# Patient Record
Sex: MALE | Race: WHITE | Employment: OTHER | URBAN - METROPOLITAN AREA
[De-identification: names, ages, dates, MRNs, and addresses within clinical notes are randomized per-mention and may not be internally consistent; named-entity substitution may affect disease eponyms.]

---

## 2019-01-01 ENCOUNTER — HOSPITAL ENCOUNTER (EMERGENCY)
Age: 74
Discharge: HOME | End: 2019-01-02
Attending: EMERGENCY MEDICINE
Payer: MEDICARE

## 2019-01-01 ENCOUNTER — APPOINTMENT (OUTPATIENT)
Dept: GENERAL RADIOLOGY | Age: 74
End: 2019-01-01
Attending: EMERGENCY MEDICINE
Payer: MEDICARE

## 2019-01-01 DIAGNOSIS — R04.2 COUGH WITH HEMOPTYSIS: Primary | ICD-10-CM

## 2019-01-01 DIAGNOSIS — J61 PULMONARY ASBESTOSIS (HCC): ICD-10-CM

## 2019-01-01 DIAGNOSIS — I48.20 ATRIAL FIBRILLATION, CHRONIC (HCC): ICD-10-CM

## 2019-01-01 DIAGNOSIS — Z79.01 ENCOUNTER FOR CURRENT LONG-TERM USE OF ANTICOAGULANTS: ICD-10-CM

## 2019-01-01 LAB
ALBUMIN SERPL-MCNC: 3.7 G/DL (ref 3.4–5)
ALBUMIN/GLOB SERPL: 1 {RATIO} (ref 0.8–1.7)
ALP SERPL-CCNC: 144 U/L (ref 45–117)
ALT SERPL-CCNC: 28 U/L (ref 16–61)
ANION GAP SERPL CALC-SCNC: 8 MMOL/L (ref 3–18)
AST SERPL-CCNC: 17 U/L (ref 15–37)
BASOPHILS # BLD: 0 K/UL (ref 0–0.1)
BASOPHILS NFR BLD: 0 % (ref 0–2)
BILIRUB SERPL-MCNC: 0.9 MG/DL (ref 0.2–1)
BUN SERPL-MCNC: 24 MG/DL (ref 7–18)
BUN/CREAT SERPL: 25
CALCIUM SERPL-MCNC: 8.9 MG/DL (ref 8.5–10.1)
CHLORIDE SERPL-SCNC: 104 MMOL/L (ref 100–108)
CK MB CFR SERPL CALC: 4.6 % (ref 0–4)
CK MB SERPL-MCNC: 1.3 NG/ML (ref 5–25)
CK SERPL-CCNC: 28 U/L (ref 39–308)
CO2 SERPL-SCNC: 27 MMOL/L (ref 21–32)
CREAT SERPL-MCNC: 0.96 MG/DL (ref 0.6–1.3)
DIFFERENTIAL METHOD BLD: ABNORMAL
EOSINOPHIL # BLD: 0.2 K/UL (ref 0–0.4)
EOSINOPHIL NFR BLD: 2 % (ref 0–5)
ERYTHROCYTE [DISTWIDTH] IN BLOOD BY AUTOMATED COUNT: 18.3 % (ref 11.6–14.5)
GLOBULIN SER CALC-MCNC: 3.7 G/DL (ref 2–4)
GLUCOSE BLD STRIP.AUTO-MCNC: 150 MG/DL (ref 70–110)
GLUCOSE SERPL-MCNC: 145 MG/DL (ref 74–99)
HCT VFR BLD AUTO: 38.7 % (ref 36–48)
HGB BLD-MCNC: 12.3 G/DL (ref 13–16)
INR PPP: 2.3 (ref 0.8–1.2)
LYMPHOCYTES # BLD: 1.2 K/UL (ref 0.9–3.6)
LYMPHOCYTES NFR BLD: 13 % (ref 21–52)
MCH RBC QN AUTO: 32.5 PG (ref 24–34)
MCHC RBC AUTO-ENTMCNC: 31.8 G/DL (ref 31–37)
MCV RBC AUTO: 102.4 FL (ref 74–97)
MONOCYTES # BLD: 1.3 K/UL (ref 0.05–1.2)
MONOCYTES NFR BLD: 13 % (ref 3–10)
NEUTS SEG # BLD: 6.8 K/UL (ref 1.8–8)
NEUTS SEG NFR BLD: 72 % (ref 40–73)
PLATELET # BLD AUTO: 268 K/UL (ref 135–420)
PMV BLD AUTO: 10.5 FL (ref 9.2–11.8)
POTASSIUM SERPL-SCNC: 4 MMOL/L (ref 3.5–5.5)
PROT SERPL-MCNC: 7.4 G/DL (ref 6.4–8.2)
PROTHROMBIN TIME: 25.2 SEC (ref 11.5–15.2)
RBC # BLD AUTO: 3.78 M/UL (ref 4.7–5.5)
SODIUM SERPL-SCNC: 139 MMOL/L (ref 136–145)
TROPONIN I SERPL-MCNC: <0.02 NG/ML (ref 0–0.04)
WBC # BLD AUTO: 9.4 K/UL (ref 4.6–13.2)

## 2019-01-01 PROCEDURE — 82962 GLUCOSE BLOOD TEST: CPT

## 2019-01-01 PROCEDURE — 99285 EMERGENCY DEPT VISIT HI MDM: CPT

## 2019-01-01 PROCEDURE — 80053 COMPREHEN METABOLIC PANEL: CPT

## 2019-01-01 PROCEDURE — 85025 COMPLETE CBC W/AUTO DIFF WBC: CPT

## 2019-01-01 PROCEDURE — 93005 ELECTROCARDIOGRAM TRACING: CPT

## 2019-01-01 PROCEDURE — 85610 PROTHROMBIN TIME: CPT

## 2019-01-01 PROCEDURE — 74011250636 HC RX REV CODE- 250/636: Performed by: EMERGENCY MEDICINE

## 2019-01-01 PROCEDURE — 71045 X-RAY EXAM CHEST 1 VIEW: CPT

## 2019-01-01 PROCEDURE — 82550 ASSAY OF CK (CPK): CPT

## 2019-01-01 PROCEDURE — 96375 TX/PRO/DX INJ NEW DRUG ADDON: CPT

## 2019-01-01 PROCEDURE — 96374 THER/PROPH/DIAG INJ IV PUSH: CPT

## 2019-01-01 RX ORDER — TAMSULOSIN HYDROCHLORIDE 0.4 MG/1
0.4 CAPSULE ORAL DAILY
COMMUNITY

## 2019-01-01 RX ORDER — ALBUTEROL SULFATE 90 UG/1
AEROSOL, METERED RESPIRATORY (INHALATION)
COMMUNITY

## 2019-01-01 RX ORDER — OMEPRAZOLE 20 MG/1
20 CAPSULE, DELAYED RELEASE ORAL DAILY
COMMUNITY

## 2019-01-01 RX ORDER — LIDOCAINE 50 MG/G
PATCH TOPICAL EVERY 24 HOURS
COMMUNITY
End: 2019-07-10

## 2019-01-01 RX ORDER — FLUTICASONE PROPIONATE AND SALMETEROL 500; 50 UG/1; UG/1
1 POWDER RESPIRATORY (INHALATION) EVERY 12 HOURS
COMMUNITY

## 2019-01-01 RX ORDER — LOSARTAN POTASSIUM 25 MG/1
25 TABLET ORAL DAILY
COMMUNITY

## 2019-01-01 RX ORDER — REPAGLINIDE 0.5 MG/1
0.5 TABLET ORAL
COMMUNITY

## 2019-01-01 RX ORDER — HYDROCODONE BITARTRATE AND ACETAMINOPHEN 5; 325 MG/1; MG/1
TABLET ORAL
COMMUNITY
End: 2019-07-10

## 2019-01-01 RX ORDER — GUAIFENESIN 100 MG/5ML
81 LIQUID (ML) ORAL DAILY
COMMUNITY

## 2019-01-01 RX ORDER — ATORVASTATIN CALCIUM 40 MG/1
40 TABLET, FILM COATED ORAL DAILY
COMMUNITY

## 2019-01-01 RX ORDER — WARFARIN 3 MG/1
3 TABLET ORAL 3 TIMES WEEKLY
COMMUNITY

## 2019-01-01 RX ORDER — WARFARIN 2 MG/1
1 TABLET ORAL
COMMUNITY

## 2019-01-01 RX ORDER — BUPROPION HYDROCHLORIDE 300 MG/1
300 TABLET ORAL
COMMUNITY

## 2019-01-01 RX ORDER — DOCUSATE SODIUM 100 MG/1
100 CAPSULE, LIQUID FILLED ORAL 2 TIMES DAILY
COMMUNITY

## 2019-01-01 RX ORDER — DONEPEZIL HYDROCHLORIDE 10 MG/1
10 TABLET, FILM COATED ORAL
COMMUNITY

## 2019-01-01 RX ORDER — POLYETHYLENE GLYCOL 3350 17 G/17G
17 POWDER, FOR SOLUTION ORAL DAILY
COMMUNITY

## 2019-01-01 RX ORDER — DULOXETIN HYDROCHLORIDE 60 MG/1
60 CAPSULE, DELAYED RELEASE ORAL DAILY
COMMUNITY

## 2019-01-01 RX ORDER — ONDANSETRON 2 MG/ML
4 INJECTION INTRAMUSCULAR; INTRAVENOUS
Status: COMPLETED | OUTPATIENT
Start: 2019-01-01 | End: 2019-01-01

## 2019-01-01 RX ORDER — BISMUTH SUBSALICYLATE 262 MG
1 TABLET,CHEWABLE ORAL DAILY
COMMUNITY

## 2019-01-01 RX ORDER — MORPHINE SULFATE 4 MG/ML
4 INJECTION INTRAVENOUS
Status: COMPLETED | OUTPATIENT
Start: 2019-01-01 | End: 2019-01-01

## 2019-01-01 RX ORDER — INSULIN LISPRO 100 [IU]/ML
INJECTION, SOLUTION INTRAVENOUS; SUBCUTANEOUS
COMMUNITY
End: 2019-07-10

## 2019-01-01 RX ADMIN — ONDANSETRON 4 MG: 2 INJECTION INTRAMUSCULAR; INTRAVENOUS at 23:00

## 2019-01-01 RX ADMIN — MORPHINE SULFATE 4 MG: 4 INJECTION INTRAVENOUS at 23:00

## 2019-01-02 VITALS
SYSTOLIC BLOOD PRESSURE: 115 MMHG | RESPIRATION RATE: 11 BRPM | BODY MASS INDEX: 22.99 KG/M2 | WEIGHT: 138 LBS | OXYGEN SATURATION: 97 % | TEMPERATURE: 97.9 F | HEART RATE: 58 BPM | HEIGHT: 65 IN | DIASTOLIC BLOOD PRESSURE: 71 MMHG

## 2019-01-02 RX ORDER — ONDANSETRON 4 MG/1
4 TABLET, FILM COATED ORAL
Qty: 12 TAB | Refills: 1 | Status: SHIPPED | OUTPATIENT
Start: 2019-01-02

## 2019-01-02 NOTE — ED NOTES
I have reviewed discharge instructions with the patient. The patient verbalized understanding. I have reviewed the provider's instructions with the patient, answering all questions to his satisfaction. Discharge medications reviewed with patient and appropriate educational materials and side effects teaching were provided. Patient armband removed and shredded. Pt signed paper discharge instructions pt escorted via wheelchair to ED exit.

## 2019-01-02 NOTE — ED TRIAGE NOTES
Pt C/O feeling weaker than normal. He has vomited x1 and has been coughing up blood for about a month but has been going to his doctor to rule out why. He has been ruled out for TB.

## 2019-01-02 NOTE — ED PROVIDER NOTES
EMERGENCY DEPARTMENT HISTORY AND PHYSICAL EXAM 
 
Date: 1/1/2019 Patient Name: Giovani Fregoso History of Presenting Illness Chief Complaint Patient presents with  Fatigue History Provided By: Patient Chief Complaint: Fatigue Duration: 3.5 hours Timing:  Worsening Location: [place] Associated Symptoms: nausea, vomiting, sharp abdominal pain, and decreased appetite Additional History (Context):  
9:53 PM 
Giovani Fregoso is a 68 y.o. male with PMHx of CAD, A-fib, and DM ans PSHx of right pneumonectomy who presents via EMS to the emergency department C/O worsening fatigue, onset 3.5 hours ago. Associated sxs include nausea, vomiting, sharp abdominal pain, and decreased appetite. Pt also C/O hemoptysis x 1 month (at least 5-6 episodes/day) and has been going to his PCP to rule out why he is coughing up blood. He has been ruled out for TB. Pt is on Coumadin. Pt reports that his weight has also been steadily dropping. Pt has follow up with pulmonologist tomorrow. FHx of HTN and DM. No H/O CHF. Pt is a former tobacco user (quit 20 years ago). Pt denies leg swelling, diarrhea, or any other sxs or complaints. PCP: Paul, MD Jr 
 
Current Outpatient Medications Medication Sig Dispense Refill  ondansetron hcl (ZOFRAN) 4 mg tablet Take 1 Tab by mouth every eight (8) hours as needed for Nausea. 12 Tab 1  
 warfarin (COUMADIN) 2 mg tablet Take 2 mg by mouth four (4) days a week.  warfarin (COUMADIN) 3 mg tablet Take 3 mg by mouth Three (3) times a week.  albuterol (PROVENTIL HFA, VENTOLIN HFA, PROAIR HFA) 90 mcg/actuation inhaler Take  by inhalation.  aspirin 81 mg chewable tablet Take 81 mg by mouth daily.  atorvastatin (LIPITOR) 40 mg tablet Take 40 mg by mouth daily.  buPROPion XL (WELLBUTRIN XL) 300 mg XL tablet Take 300 mg by mouth every morning.  docusate sodium (COLACE) 100 mg capsule Take 100 mg by mouth two (2) times a day.  donepezil (ARICEPT) 10 mg tablet Take 10 mg by mouth nightly.  DULoxetine (CYMBALTA) 60 mg capsule Take 60 mg by mouth daily.  ferrous sulfate 325 mg (65 mg iron) cpER Take  by mouth.  fluticasone-salmeterol (ADVAIR DISKUS) 500-50 mcg/dose diskus inhaler Take 1 Puff by inhalation every twelve (12) hours.  HYDROcodone-acetaminophen (NORCO) 5-325 mg per tablet Take  by mouth.  insulin lispro (HUMALOG) 100 unit/mL kwikpen by SubCUTAneous route.  losartan (COZAAR) 25 mg tablet Take 25 mg by mouth daily.  omeprazole (PRILOSEC) 20 mg capsule Take 20 mg by mouth daily.  lidocaine (LIDODERM) 5 % by TransDERmal route every twenty-four (24) hours. Apply patch to the affected area for 12 hours a day and remove for 12 hours a day.  polyethylene glycol (MIRALAX) 17 gram/dose powder Take 17 g by mouth daily.  repaglinide (PRANDIN) 0.5 mg tablet Take 0.5 mg by mouth Before breakfast, lunch, and dinner.  SITagliptin (JANUVIA) 100 mg tablet Take 100 mg by mouth daily.  tamsulosin (FLOMAX) 0.4 mg capsule Take 0.4 mg by mouth daily.  multivitamin (ONE A DAY) tablet Take 1 Tab by mouth daily. Past History Past Medical History: 
Past Medical History:  
Diagnosis Date  A-fib ([facility] [address]  CAD (coronary artery disease)  Diabetes ([facility] [address]  Pleural effusion Past Surgical History: 
Past Surgical History:  
Procedure Laterality Date  CARDIAC SURG PROCEDURE UNLIST    
 double bypass Family History: 
History reviewed. No pertinent family history. Social History: 
Social History Tobacco Use  Smoking status: Former Smoker  Smokeless tobacco: Never Used Substance Use Topics  Alcohol use: No  
  Frequency: Never  Drug use: No  
 
 
Allergies: 
No Known Allergies Review of Systems Review of Systems Constitutional: Positive for appetite change (decreased) and fatigue. Negative for chills, diaphoresis and fever. HENT: Negative for congestion, drooling, ear pain, rhinorrhea, sore throat, tinnitus and trouble swallowing. Eyes: Negative for photophobia, pain, redness and visual disturbance. Respiratory: Positive for cough (hemoptysis x 1 month). Negative for choking, chest tightness, shortness of breath, wheezing and stridor. Cardiovascular: Negative for chest pain, palpitations and leg swelling. Gastrointestinal: Positive for abdominal pain (sharp), nausea and vomiting. Negative for abdominal distention, anal bleeding, blood in stool, constipation and diarrhea. Genitourinary: Negative for difficulty urinating, dysuria, flank pain, frequency, hematuria and urgency. Musculoskeletal: Negative for arthralgias, back pain and neck pain. Skin: Negative for color change, rash and wound. Neurological: Negative for dizziness, seizures, syncope, speech difficulty, light-headedness and headaches. Hematological: Does not bruise/bleed easily. Psychiatric/Behavioral: Negative for agitation, behavioral problems, hallucinations, self-injury and suicidal ideas. The patient is not hyperactive. Physical Exam  
 
Vitals:  
 01/01/19 2145 01/01/19 2245 01/02/19 0015 01/02/19 0100 BP: 115/72 120/69 127/79 126/88 Pulse: 63 63 63 65 Resp: 14 18 13 15 Temp:      
SpO2: 100% 98% 96% 99% Weight:      
Height:      
 
Physical Exam  
Constitutional: He is oriented to person, place, and time. He appears well-developed and well-nourished. No distress. Elderly male, well appearing, non-toxic HENT:  
Head: Normocephalic and atraumatic. Right Ear: External ear normal.  
Left Ear: External ear normal.  
Mouth/Throat: Oropharynx is clear and moist. Mucous membranes are dry. No oropharyngeal exudate. No cyanosis Eyes: Conjunctivae and EOM are normal. Pupils are equal, round, and reactive to light. No scleral icterus. No pallor, no icterus Neck: Normal range of motion. Neck supple. No JVD present.  No tracheal deviation present. No thyromegaly present. Cardiovascular: Normal heart sounds. An irregularly irregular rhythm present. Bradycardia present. Borderline bradycardia Pulmonary/Chest: Effort normal. No stridor. No respiratory distress. He has decreased breath sounds in the right upper field and the right middle field. Practically absent breath sound in right upper and middle with faint air movement noted posteriorly Abdominal: Soft. Bowel sounds are normal. He exhibits no distension. There is no tenderness. There is no rebound and no guarding. Musculoskeletal: Normal range of motion. He exhibits no edema or tenderness. No soft tissue injuries Lymphadenopathy:  
  He has no cervical adenopathy. Neurological: He is alert and oriented to person, place, and time. He has normal reflexes. No cranial nerve deficit. Coordination normal.  
Skin: Skin is warm and dry. No rash noted. He is not diaphoretic. No cyanosis or erythema. No cyanosis. Well healed surgical scars midline chest and right posterior flank Psychiatric: He has a normal mood and affect. His behavior is normal. Judgment and thought content normal.  
Nursing note and vitals reviewed. Diagnostic Study Results Labs - Recent Results (from the past 12 hour(s)) EKG, 12 LEAD, INITIAL Collection Time: 01/01/19  8:07 PM  
Result Value Ref Range Ventricular Rate 59 BPM  
 Atrial Rate 52 BPM  
 QRS Duration 126 ms  
 Q-T Interval 428 ms QTC Calculation (Bezet) 423 ms Calculated R Axis 95 degrees Calculated T Axis 11 degrees Diagnosis Atrial fibrillation with slow ventricular response with a competing  
junctional pacemaker Right bundle branch block Abnormal ECG No previous ECGs available CBC WITH AUTOMATED DIFF Collection Time: 01/01/19  8:10 PM  
Result Value Ref Range WBC 9.4 4.6 - 13.2 K/uL  
 RBC 3.78 (L) 4.70 - 5.50 M/uL  
 HGB 12.3 (L) 13.0 - 16.0 g/dL HCT 38.7 36.0 - 48.0 % .4 (H) 74.0 - 97.0 FL  
 MCH 32.5 24.0 - 34.0 PG  
 MCHC 31.8 31.0 - 37.0 g/dL  
 RDW 18.3 (H) 11.6 - 14.5 % PLATELET 819 600 - 458 K/uL MPV 10.5 9.2 - 11.8 FL  
 NEUTROPHILS 72 40 - 73 % LYMPHOCYTES 13 (L) 21 - 52 % MONOCYTES 13 (H) 3 - 10 % EOSINOPHILS 2 0 - 5 % BASOPHILS 0 0 - 2 %  
 ABS. NEUTROPHILS 6.8 1.8 - 8.0 K/UL  
 ABS. LYMPHOCYTES 1.2 0.9 - 3.6 K/UL  
 ABS. MONOCYTES 1.3 (H) 0.05 - 1.2 K/UL  
 ABS. EOSINOPHILS 0.2 0.0 - 0.4 K/UL  
 ABS. BASOPHILS 0.0 0.0 - 0.1 K/UL  
 DF AUTOMATED METABOLIC PANEL, COMPREHENSIVE Collection Time: 01/01/19  8:10 PM  
Result Value Ref Range Sodium 139 136 - 145 mmol/L Potassium 4.0 3.5 - 5.5 mmol/L Chloride 104 100 - 108 mmol/L  
 CO2 27 21 - 32 mmol/L Anion gap 8 3.0 - 18 mmol/L Glucose 145 (H) 74 - 99 mg/dL BUN 24 (H) 7.0 - 18 MG/DL Creatinine 0.96 0.6 - 1.3 MG/DL  
 BUN/Creatinine ratio 25 GFR est AA >60 >60 ml/min/1.73m2 GFR est non-AA >60 >60 ml/min/1.73m2 Calcium 8.9 8.5 - 10.1 MG/DL Bilirubin, total 0.9 0.2 - 1.0 MG/DL  
 ALT (SGPT) 28 16 - 61 U/L  
 AST (SGOT) 17 15 - 37 U/L Alk. phosphatase 144 (H) 45 - 117 U/L Protein, total 7.4 6.4 - 8.2 g/dL Albumin 3.7 3.4 - 5.0 g/dL Globulin 3.7 2.0 - 4.0 g/dL A-G Ratio 1.0 0.8 - 1.7 CARDIAC PANEL,(CK, CKMB & TROPONIN) Collection Time: 01/01/19  8:10 PM  
Result Value Ref Range CK 28 (L) 39 - 308 U/L  
 CK - MB 1.3 <3.6 ng/ml CK-MB Index 4.6 (H) 0.0 - 4.0 % Troponin-I, QT <0.02 0.0 - 0.045 NG/ML  
GLUCOSE, POC Collection Time: 01/01/19  8:12 PM  
Result Value Ref Range Glucose (POC) 150 (H) 70 - 110 mg/dL PROTHROMBIN TIME + INR Collection Time: 01/01/19 11:10 PM  
Result Value Ref Range Prothrombin time 25.2 (H) 11.5 - 15.2 sec INR 2.3 (H) 0.8 - 1.2 Radiologic Studies -   
XR CHEST PORT    (Results Pending) 1:22 AM 
RADIOLOGY FINDINGS 
 Chest X-ray shows pleural effusion to the right with surgical clippings, otherwise unremarkable Pending review by Radiologist 
Recorded by Héctor Mcknight, ED Scribe, as dictated by Daniela Johnson. Shabana Rolle MD 
 
CT Results  (Last 48 hours) None CXR Results  (Last 48 hours) None Medical Decision Making I am the first provider for this patient. I reviewed the vital signs, available nursing notes, past medical history, past surgical history, family history and social history. Vital Signs-Reviewed the patient's vital signs. Pulse Oximetry Analysis - 100% on room air Cardiac Monitor: 
Rate: 60 bpm 
Rhythm: A-fib EKG interpretation: (Preliminary) 8:07 PM 
A-fib with slow ventricular response at 59 bpm. RBBB. Nonspecific ST changes EKG read by Daniela Johnson. Shabana Rolle MD  
 
Records Reviewed: Nursing Notes and Old Medical Records He had a right sided VATS and decortication, probably secondary to asbestos exposure and smoking. Review of his cardiac record shows extensive CAD as well. Provider Notes (Medical Decision Making):  
Ddx: Gross hemoptysis in elderly male on blood thinners. Hemoptysis is probably tied in with his ongoing cough. XR shows small effusion but not much worse than his previous described CT dated 12/21/18. Unlikely to be PE, CHF, tumor, CA, or TB. Procedures: 
Procedures MEDICATIONS GIVEN: 
Medications  
morphine injection 4 mg (4 mg IntraVENous Given 1/1/19 2300) ondansetron (ZOFRAN) injection 4 mg (4 mg IntraVENous Given 1/1/19 2300) ED Course:  
9:53 PM  
Initial assessment performed. The patients presenting problems have been discussed, and they are in agreement with the care plan formulated and outlined with them. I have encouraged them to ask questions as they arise throughout their visit.  
 
1:15 AM Discussed patient's history, exam, and available diagnostics results with Cherry Lira MD, pulmonologist, who can contact Dr. Sherley Elmore Yoko Nieves and should be available tomorrow as walk-in. Diagnosis and Disposition DISCHARGE NOTE: 
1:22 AM 
Fabiola Mena's  results have been reviewed with him. He has been counseled regarding his diagnosis, treatment, and plan. He verbally conveys understanding and agreement of the signs, symptoms, diagnosis, treatment and prognosis and additionally agrees to follow up as discussed. He also agrees with the care-plan and conveys that all of his questions have been answered. I have also provided discharge instructions for him that include: educational information regarding their diagnosis and treatment, and list of reasons why they would want to return to the ED prior to their follow-up appointment, should his condition change. He has been provided with education for proper emergency department utilization. CLINICAL IMPRESSION: 
 
1. Cough with hemoptysis 2. Pulmonary asbestosis ([facility] [address] 3. Encounter for current long-term use of anticoagulants 4. Atrial fibrillation, chronic ([facility] [address] PLAN: 
1. D/C Home 2. Current Discharge Medication List  
  
START taking these medications Details  
ondansetron hcl (ZOFRAN) 4 mg tablet Take 1 Tab by mouth every eight (8) hours as needed for Nausea. Qty: 12 Tab, Refills: 1 CONTINUE these medications which have NOT CHANGED Details  
!! warfarin (COUMADIN) 2 mg tablet Take 2 mg by mouth four (4) days a week. !! warfarin (COUMADIN) 3 mg tablet Take 3 mg by mouth Three (3) times a week. albuterol (PROVENTIL HFA, VENTOLIN HFA, PROAIR HFA) 90 mcg/actuation inhaler Take  by inhalation. aspirin 81 mg chewable tablet Take 81 mg by mouth daily. atorvastatin (LIPITOR) 40 mg tablet Take 40 mg by mouth daily. buPROPion XL (WELLBUTRIN XL) 300 mg XL tablet Take 300 mg by mouth every morning. docusate sodium (COLACE) 100 mg capsule Take 100 mg by mouth two (2) times a day. donepezil (ARICEPT) 10 mg tablet Take 10 mg by mouth nightly. DULoxetine (CYMBALTA) 60 mg capsule Take 60 mg by mouth daily. ferrous sulfate 325 mg (65 mg iron) cpER Take  by mouth. fluticasone-salmeterol (ADVAIR DISKUS) 500-50 mcg/dose diskus inhaler Take 1 Puff by inhalation every twelve (12) hours. HYDROcodone-acetaminophen (NORCO) 5-325 mg per tablet Take  by mouth. insulin lispro (HUMALOG) 100 unit/mL kwikpen by SubCUTAneous route. losartan (COZAAR) 25 mg tablet Take 25 mg by mouth daily. omeprazole (PRILOSEC) 20 mg capsule Take 20 mg by mouth daily. lidocaine (LIDODERM) 5 % by TransDERmal route every twenty-four (24) hours. Apply patch to the affected area for 12 hours a day and remove for 12 hours a day. polyethylene glycol (MIRALAX) 17 gram/dose powder Take 17 g by mouth daily. repaglinide (PRANDIN) 0.5 mg tablet Take 0.5 mg by mouth Before breakfast, lunch, and dinner. SITagliptin (JANUVIA) 100 mg tablet Take 100 mg by mouth daily. tamsulosin (FLOMAX) 0.4 mg capsule Take 0.4 mg by mouth daily. multivitamin (ONE A DAY) tablet Take 1 Tab by mouth daily. !! - Potential duplicate medications found. Please discuss with provider. 3.  
Follow-up Information Follow up With Specialties Details Why Contact Info Jarett Benoit MD Pulmonary Disease Go today For follow up with pulmonologist in the morning [address] 
[phone] [facility] EMERGENCY DEPT Emergency Medicine  As needed, If symptoms worsen [address] 
[address] [facility] [address] [phone]  
  
 
_______________________________ Attestations: This note is prepared by Lj Alfredo, acting as Scribe for Janine. Jt Ashford MD. [employer]. Jt Ashford MD:  The scribe's documentation has been prepared under my direction and personally reviewed by me in its entirety.   I confirm that the note above accurately reflects all work, treatment, procedures, and medical decision making performed by me. 
 
_______________________________

## 2019-05-14 LAB
ATRIAL RATE: 52 BPM
CALCULATED R AXIS, ECG10: 95 DEGREES
CALCULATED T AXIS, ECG11: 11 DEGREES
DIAGNOSIS, 93000: NORMAL
Q-T INTERVAL, ECG07: 428 MS
QRS DURATION, ECG06: 126 MS
QTC CALCULATION (BEZET), ECG08: 423 MS
VENTRICULAR RATE, ECG03: 59 BPM

## 2019-07-04 ENCOUNTER — HOSPITAL ENCOUNTER (INPATIENT)
Age: 74
LOS: 2 days | Discharge: NURSING, REHAB OR LONG-TERM CARE | DRG: 190 | End: 2019-07-10
Attending: EMERGENCY MEDICINE | Admitting: HOSPITALIST
Payer: MEDICARE

## 2019-07-04 ENCOUNTER — APPOINTMENT (OUTPATIENT)
Dept: GENERAL RADIOLOGY | Age: 74
DRG: 190 | End: 2019-07-04
Attending: EMERGENCY MEDICINE
Payer: MEDICARE

## 2019-07-04 DIAGNOSIS — J44.1 COPD EXACERBATION (HCC): Primary | ICD-10-CM

## 2019-07-04 LAB
ALBUMIN SERPL-MCNC: 2.9 G/DL (ref 3.4–5)
ALBUMIN/GLOB SERPL: 0.7 {RATIO} (ref 0.8–1.7)
ALP SERPL-CCNC: 139 U/L (ref 45–117)
ALT SERPL-CCNC: 23 U/L (ref 16–61)
ANION GAP SERPL CALC-SCNC: 6 MMOL/L (ref 3–18)
ARTERIAL PATENCY WRIST A: YES
AST SERPL-CCNC: 22 U/L (ref 15–37)
BASE EXCESS BLD CALC-SCNC: 20 MMOL/L
BASOPHILS # BLD: 0.1 K/UL (ref 0–0.1)
BASOPHILS NFR BLD: 1 % (ref 0–3)
BDY SITE: ABNORMAL
BILIRUB SERPL-MCNC: 0.8 MG/DL (ref 0.2–1)
BNP SERPL-MCNC: 300 PG/ML (ref 0–900)
BODY TEMPERATURE: 98.5
BUN SERPL-MCNC: 15 MG/DL (ref 7–18)
BUN/CREAT SERPL: 25 (ref 12–20)
CALCIUM SERPL-MCNC: 9.8 MG/DL (ref 8.5–10.1)
CHLORIDE SERPL-SCNC: 92 MMOL/L (ref 100–108)
CK MB CFR SERPL CALC: 7.2 % (ref 0–4)
CK MB SERPL-MCNC: 2.8 NG/ML (ref 5–25)
CK SERPL-CCNC: 39 U/L (ref 39–308)
CO2 SERPL-SCNC: 40 MMOL/L (ref 21–32)
CREAT SERPL-MCNC: 0.59 MG/DL (ref 0.6–1.3)
DIFFERENTIAL METHOD BLD: ABNORMAL
EOSINOPHIL # BLD: 0 K/UL (ref 0–0.4)
EOSINOPHIL NFR BLD: 0 % (ref 0–5)
ERYTHROCYTE [DISTWIDTH] IN BLOOD BY AUTOMATED COUNT: 19.6 % (ref 11.6–14.5)
GAS FLOW.O2 O2 DELIVERY SYS: ABNORMAL L/MIN
GAS FLOW.O2 SETTING OXYMISER: 3 L/M
GLOBULIN SER CALC-MCNC: 4.1 G/DL (ref 2–4)
GLUCOSE SERPL-MCNC: 233 MG/DL (ref 74–99)
HCO3 BLD-SCNC: 43.2 MMOL/L (ref 22–26)
HCT VFR BLD AUTO: 34 % (ref 36–48)
HGB BLD-MCNC: 10.4 G/DL (ref 13–16)
INR PPP: 1.1 (ref 0.8–1.2)
LACTATE BLD-SCNC: 0.9 MMOL/L (ref 0.4–2)
LYMPHOCYTES # BLD: 1.4 K/UL (ref 0.8–3.5)
LYMPHOCYTES NFR BLD: 11 % (ref 20–51)
MCH RBC QN AUTO: 30.9 PG (ref 24–34)
MCHC RBC AUTO-ENTMCNC: 30.6 G/DL (ref 31–37)
MCV RBC AUTO: 100.9 FL (ref 74–97)
MONOCYTES # BLD: 0.5 K/UL (ref 0–1)
MONOCYTES NFR BLD: 4 % (ref 2–9)
NEUTS SEG # BLD: 10.5 K/UL (ref 1.8–8)
NEUTS SEG NFR BLD: 84 % (ref 42–75)
O2/TOTAL GAS SETTING VFR VENT: 32 %
PCO2 BLD: 58.2 MMHG (ref 35–45)
PH BLD: 7.48 [PH] (ref 7.35–7.45)
PLATELET # BLD AUTO: 434 K/UL (ref 135–420)
PMV BLD AUTO: 10.4 FL (ref 9.2–11.8)
PO2 BLD: 120 MMHG (ref 80–100)
POTASSIUM SERPL-SCNC: 4.1 MMOL/L (ref 3.5–5.5)
PROT SERPL-MCNC: 7 G/DL (ref 6.4–8.2)
PROTHROMBIN TIME: 13.8 SEC (ref 11.5–15.2)
RBC # BLD AUTO: 3.37 M/UL (ref 4.7–5.5)
RBC MORPH BLD: ABNORMAL
SAO2 % BLD: 99 % (ref 92–97)
SERVICE CMNT-IMP: ABNORMAL
SODIUM SERPL-SCNC: 138 MMOL/L (ref 136–145)
SPECIMEN TYPE: ABNORMAL
TOTAL RESP. RATE, ITRR: 20
TROPONIN I SERPL-MCNC: <0.02 NG/ML (ref 0–0.04)
WBC # BLD AUTO: 12.5 K/UL (ref 4.6–13.2)

## 2019-07-04 PROCEDURE — 83880 ASSAY OF NATRIURETIC PEPTIDE: CPT

## 2019-07-04 PROCEDURE — 93005 ELECTROCARDIOGRAM TRACING: CPT

## 2019-07-04 PROCEDURE — 99285 EMERGENCY DEPT VISIT HI MDM: CPT

## 2019-07-04 PROCEDURE — 83605 ASSAY OF LACTIC ACID: CPT

## 2019-07-04 PROCEDURE — 82550 ASSAY OF CK (CPK): CPT

## 2019-07-04 PROCEDURE — 82803 BLOOD GASES ANY COMBINATION: CPT

## 2019-07-04 PROCEDURE — 80053 COMPREHEN METABOLIC PANEL: CPT

## 2019-07-04 PROCEDURE — 96375 TX/PRO/DX INJ NEW DRUG ADDON: CPT

## 2019-07-04 PROCEDURE — 36600 WITHDRAWAL OF ARTERIAL BLOOD: CPT

## 2019-07-04 PROCEDURE — 74011250636 HC RX REV CODE- 250/636: Performed by: EMERGENCY MEDICINE

## 2019-07-04 PROCEDURE — 74011000250 HC RX REV CODE- 250: Performed by: EMERGENCY MEDICINE

## 2019-07-04 PROCEDURE — 85610 PROTHROMBIN TIME: CPT

## 2019-07-04 PROCEDURE — 71045 X-RAY EXAM CHEST 1 VIEW: CPT

## 2019-07-04 PROCEDURE — 96374 THER/PROPH/DIAG INJ IV PUSH: CPT

## 2019-07-04 PROCEDURE — 85025 COMPLETE CBC W/AUTO DIFF WBC: CPT

## 2019-07-04 PROCEDURE — 94640 AIRWAY INHALATION TREATMENT: CPT

## 2019-07-04 PROCEDURE — 77030013140 HC MSK NEB VYRM -A

## 2019-07-04 RX ORDER — IPRATROPIUM BROMIDE AND ALBUTEROL SULFATE 2.5; .5 MG/3ML; MG/3ML
3 SOLUTION RESPIRATORY (INHALATION) ONCE
Status: COMPLETED | OUTPATIENT
Start: 2019-07-04 | End: 2019-07-04

## 2019-07-04 RX ADMIN — IPRATROPIUM BROMIDE AND ALBUTEROL SULFATE 3 ML: .5; 3 SOLUTION RESPIRATORY (INHALATION) at 20:50

## 2019-07-04 RX ADMIN — METHYLPREDNISOLONE SODIUM SUCCINATE 125 MG: 125 INJECTION, POWDER, FOR SOLUTION INTRAMUSCULAR; INTRAVENOUS at 21:30

## 2019-07-05 ENCOUNTER — APPOINTMENT (OUTPATIENT)
Dept: GENERAL RADIOLOGY | Age: 74
DRG: 190 | End: 2019-07-05
Attending: PHYSICIAN ASSISTANT
Payer: MEDICARE

## 2019-07-05 PROBLEM — R53.1 WEAKNESS: Status: ACTIVE | Noted: 2019-07-05

## 2019-07-05 PROBLEM — E11.9 DIABETES (HCC): Status: ACTIVE | Noted: 2019-07-05

## 2019-07-05 PROBLEM — I25.10 CAD (CORONARY ARTERY DISEASE): Status: ACTIVE | Noted: 2019-07-05

## 2019-07-05 PROBLEM — J90 PLEURAL EFFUSION: Status: ACTIVE | Noted: 2019-07-05

## 2019-07-05 PROBLEM — I48.91 A-FIB (HCC): Status: ACTIVE | Noted: 2019-07-05

## 2019-07-05 PROBLEM — J44.1 COPD EXACERBATION (HCC): Status: ACTIVE | Noted: 2019-07-05

## 2019-07-05 LAB
ATRIAL RATE: 468 BPM
C DIFF TOX GENS STL QL NAA+PROBE: NEGATIVE
CALCULATED R AXIS, ECG10: 99 DEGREES
CALCULATED T AXIS, ECG11: -14 DEGREES
CK MB CFR SERPL CALC: 6.9 % (ref 0–4)
CK MB CFR SERPL CALC: 7.7 % (ref 0–4)
CK MB CFR SERPL CALC: 8.1 % (ref 0–4)
CK MB SERPL-MCNC: 2 NG/ML (ref 5–25)
CK MB SERPL-MCNC: 2.3 NG/ML (ref 5–25)
CK MB SERPL-MCNC: 2.5 NG/ML (ref 5–25)
CK SERPL-CCNC: 29 U/L (ref 39–308)
CK SERPL-CCNC: 30 U/L (ref 39–308)
CK SERPL-CCNC: 31 U/L (ref 39–308)
DIAGNOSIS, 93000: NORMAL
GLUCOSE BLD STRIP.AUTO-MCNC: 238 MG/DL (ref 70–110)
GLUCOSE BLD STRIP.AUTO-MCNC: 272 MG/DL (ref 70–110)
GLUCOSE BLD STRIP.AUTO-MCNC: 290 MG/DL (ref 70–110)
GLUCOSE BLD STRIP.AUTO-MCNC: 319 MG/DL (ref 70–110)
HBA1C MFR BLD: 6.5 % (ref 4.2–5.6)
INR PPP: 1.1 (ref 0.8–1.2)
INTERPRETATION: ABNORMAL
PCR REFLEX: ABNORMAL
PROTHROMBIN TIME: 13.9 SEC (ref 11.5–15.2)
Q-T INTERVAL, ECG07: 358 MS
QRS DURATION, ECG06: 122 MS
QTC CALCULATION (BEZET), ECG08: 452 MS
TROPONIN I SERPL-MCNC: 0.02 NG/ML (ref 0–0.04)
VENTRICULAR RATE, ECG03: 96 BPM

## 2019-07-05 PROCEDURE — 92526 ORAL FUNCTION THERAPY: CPT

## 2019-07-05 PROCEDURE — 77030037877 HC DRSG MEPILEX >48IN BORD MOLN -A

## 2019-07-05 PROCEDURE — 74011636637 HC RX REV CODE- 636/637: Performed by: FAMILY MEDICINE

## 2019-07-05 PROCEDURE — 96361 HYDRATE IV INFUSION ADD-ON: CPT

## 2019-07-05 PROCEDURE — 97163 PT EVAL HIGH COMPLEX 45 MIN: CPT

## 2019-07-05 PROCEDURE — 36415 COLL VENOUS BLD VENIPUNCTURE: CPT

## 2019-07-05 PROCEDURE — 92611 MOTION FLUOROSCOPY/SWALLOW: CPT

## 2019-07-05 PROCEDURE — 99218 HC RM OBSERVATION: CPT

## 2019-07-05 PROCEDURE — 92610 EVALUATE SWALLOWING FUNCTION: CPT

## 2019-07-05 PROCEDURE — 82962 GLUCOSE BLOOD TEST: CPT

## 2019-07-05 PROCEDURE — 74230 X-RAY XM SWLNG FUNCJ C+: CPT

## 2019-07-05 PROCEDURE — 65390000012 HC CONDITION CODE 44 OBSERVATION

## 2019-07-05 PROCEDURE — 77010033678 HC OXYGEN DAILY

## 2019-07-05 PROCEDURE — 87449 NOS EACH ORGANISM AG IA: CPT

## 2019-07-05 PROCEDURE — 82553 CREATINE MB FRACTION: CPT

## 2019-07-05 PROCEDURE — 74011000255 HC RX REV CODE- 255: Performed by: FAMILY MEDICINE

## 2019-07-05 PROCEDURE — 83036 HEMOGLOBIN GLYCOSYLATED A1C: CPT

## 2019-07-05 PROCEDURE — 87493 C DIFF AMPLIFIED PROBE: CPT

## 2019-07-05 PROCEDURE — 85610 PROTHROMBIN TIME: CPT

## 2019-07-05 PROCEDURE — 77030033269 HC SLV COMPR SCD KNE2 CARD -B

## 2019-07-05 PROCEDURE — 74011250637 HC RX REV CODE- 250/637: Performed by: FAMILY MEDICINE

## 2019-07-05 PROCEDURE — 74011250636 HC RX REV CODE- 250/636: Performed by: FAMILY MEDICINE

## 2019-07-05 PROCEDURE — 74011000250 HC RX REV CODE- 250: Performed by: FAMILY MEDICINE

## 2019-07-05 RX ORDER — HYDROCODONE BITARTRATE AND ACETAMINOPHEN 5; 325 MG/1; MG/1
1 TABLET ORAL
Status: DISCONTINUED | OUTPATIENT
Start: 2019-07-05 | End: 2019-07-05

## 2019-07-05 RX ORDER — ACETAMINOPHEN 325 MG/1
650 TABLET ORAL
Status: DISCONTINUED | OUTPATIENT
Start: 2019-07-05 | End: 2019-07-10 | Stop reason: HOSPADM

## 2019-07-05 RX ORDER — LIDOCAINE 4 G/100G
1 PATCH TOPICAL EVERY 24 HOURS
Status: DISCONTINUED | OUTPATIENT
Start: 2019-07-05 | End: 2019-07-10 | Stop reason: HOSPADM

## 2019-07-05 RX ORDER — LORAZEPAM 1 MG/1
1 TABLET ORAL
Status: DISCONTINUED | OUTPATIENT
Start: 2019-07-05 | End: 2019-07-10 | Stop reason: HOSPADM

## 2019-07-05 RX ORDER — ALBUTEROL SULFATE 90 UG/1
2 AEROSOL, METERED RESPIRATORY (INHALATION)
Status: DISCONTINUED | OUTPATIENT
Start: 2019-07-05 | End: 2019-07-10 | Stop reason: HOSPADM

## 2019-07-05 RX ORDER — ATORVASTATIN CALCIUM 20 MG/1
40 TABLET, FILM COATED ORAL DAILY
Status: DISCONTINUED | OUTPATIENT
Start: 2019-07-05 | End: 2019-07-10 | Stop reason: HOSPADM

## 2019-07-05 RX ORDER — TAMSULOSIN HYDROCHLORIDE 0.4 MG/1
0.4 CAPSULE ORAL DAILY
Status: DISCONTINUED | OUTPATIENT
Start: 2019-07-05 | End: 2019-07-10 | Stop reason: HOSPADM

## 2019-07-05 RX ORDER — BUPROPION HYDROCHLORIDE 150 MG/1
300 TABLET ORAL
Status: DISCONTINUED | OUTPATIENT
Start: 2019-07-05 | End: 2019-07-10 | Stop reason: HOSPADM

## 2019-07-05 RX ORDER — DULOXETIN HYDROCHLORIDE 60 MG/1
60 CAPSULE, DELAYED RELEASE ORAL DAILY
Status: DISCONTINUED | OUTPATIENT
Start: 2019-07-05 | End: 2019-07-10 | Stop reason: HOSPADM

## 2019-07-05 RX ORDER — WARFARIN 1 MG/1
1 TABLET ORAL
Status: DISCONTINUED | OUTPATIENT
Start: 2019-07-05 | End: 2019-07-05 | Stop reason: DRUGHIGH

## 2019-07-05 RX ORDER — ONDANSETRON 2 MG/ML
4 INJECTION INTRAMUSCULAR; INTRAVENOUS
Status: DISCONTINUED | OUTPATIENT
Start: 2019-07-05 | End: 2019-07-10 | Stop reason: HOSPADM

## 2019-07-05 RX ORDER — OXYCODONE HYDROCHLORIDE 5 MG/1
5 TABLET ORAL
Status: DISCONTINUED | OUTPATIENT
Start: 2019-07-05 | End: 2019-07-10 | Stop reason: HOSPADM

## 2019-07-05 RX ORDER — MAGNESIUM SULFATE 100 %
16 CRYSTALS MISCELLANEOUS AS NEEDED
Status: DISCONTINUED | OUTPATIENT
Start: 2019-07-05 | End: 2019-07-10 | Stop reason: HOSPADM

## 2019-07-05 RX ORDER — SODIUM CHLORIDE 9 MG/ML
100 INJECTION, SOLUTION INTRAVENOUS CONTINUOUS
Status: DISCONTINUED | OUTPATIENT
Start: 2019-07-05 | End: 2019-07-06

## 2019-07-05 RX ORDER — DONEPEZIL HYDROCHLORIDE 5 MG/1
10 TABLET, FILM COATED ORAL
Status: DISCONTINUED | OUTPATIENT
Start: 2019-07-05 | End: 2019-07-10 | Stop reason: HOSPADM

## 2019-07-05 RX ORDER — FLUTICASONE FUROATE AND VILANTEROL 200; 25 UG/1; UG/1
1 POWDER RESPIRATORY (INHALATION) DAILY
Status: DISCONTINUED | OUTPATIENT
Start: 2019-07-05 | End: 2019-07-10 | Stop reason: HOSPADM

## 2019-07-05 RX ORDER — WARFARIN 1 MG/1
1.5 TABLET ORAL ONCE
Status: COMPLETED | OUTPATIENT
Start: 2019-07-05 | End: 2019-07-05

## 2019-07-05 RX ORDER — LANOLIN ALCOHOL/MO/W.PET/CERES
325 CREAM (GRAM) TOPICAL DAILY
Status: DISCONTINUED | OUTPATIENT
Start: 2019-07-05 | End: 2019-07-10 | Stop reason: HOSPADM

## 2019-07-05 RX ORDER — OMEPRAZOLE 20 MG/1
20 CAPSULE, DELAYED RELEASE ORAL DAILY
Status: DISCONTINUED | OUTPATIENT
Start: 2019-07-05 | End: 2019-07-10 | Stop reason: HOSPADM

## 2019-07-05 RX ORDER — LOSARTAN POTASSIUM 25 MG/1
25 TABLET ORAL DAILY
Status: DISCONTINUED | OUTPATIENT
Start: 2019-07-05 | End: 2019-07-10 | Stop reason: HOSPADM

## 2019-07-05 RX ORDER — INSULIN LISPRO 100 [IU]/ML
INJECTION, SOLUTION INTRAVENOUS; SUBCUTANEOUS
Status: DISCONTINUED | OUTPATIENT
Start: 2019-07-05 | End: 2019-07-10 | Stop reason: HOSPADM

## 2019-07-05 RX ORDER — DOCUSATE SODIUM 100 MG/1
100 CAPSULE, LIQUID FILLED ORAL
Status: DISCONTINUED | OUTPATIENT
Start: 2019-07-05 | End: 2019-07-10 | Stop reason: HOSPADM

## 2019-07-05 RX ADMIN — BARIUM SULFATE 10 ML: 400 PASTE ORAL at 12:50

## 2019-07-05 RX ADMIN — BUPROPION HYDROCHLORIDE 300 MG: 150 TABLET, EXTENDED RELEASE ORAL at 06:59

## 2019-07-05 RX ADMIN — SODIUM CHLORIDE 100 ML/HR: 900 INJECTION, SOLUTION INTRAVENOUS at 14:55

## 2019-07-05 RX ADMIN — FERROUS SULFATE TAB 325 MG (65 MG ELEMENTAL FE) 325 MG: 325 (65 FE) TAB at 08:12

## 2019-07-05 RX ADMIN — DONEPEZIL HYDROCHLORIDE 10 MG: 5 TABLET, FILM COATED ORAL at 22:04

## 2019-07-05 RX ADMIN — TAMSULOSIN HYDROCHLORIDE 0.4 MG: 0.4 CAPSULE ORAL at 08:12

## 2019-07-05 RX ADMIN — SODIUM CHLORIDE 100 ML/HR: 900 INJECTION, SOLUTION INTRAVENOUS at 03:01

## 2019-07-05 RX ADMIN — MULTIPLE VITAMINS W/ MINERALS TAB 1 TABLET: TAB at 08:12

## 2019-07-05 RX ADMIN — INSULIN LISPRO 4 UNITS: 100 INJECTION, SOLUTION INTRAVENOUS; SUBCUTANEOUS at 16:56

## 2019-07-05 RX ADMIN — WARFARIN SODIUM 1.5 MG: 1 TABLET ORAL at 17:50

## 2019-07-05 RX ADMIN — DONEPEZIL HYDROCHLORIDE 10 MG: 5 TABLET, FILM COATED ORAL at 03:00

## 2019-07-05 RX ADMIN — OMEPRAZOLE 20 MG: 20 CAPSULE, DELAYED RELEASE ORAL at 08:12

## 2019-07-05 RX ADMIN — OXYCODONE HYDROCHLORIDE 5 MG: 5 TABLET ORAL at 17:54

## 2019-07-05 RX ADMIN — ACETAMINOPHEN 650 MG: 325 TABLET ORAL at 17:54

## 2019-07-05 RX ADMIN — INSULIN LISPRO 6 UNITS: 100 INJECTION, SOLUTION INTRAVENOUS; SUBCUTANEOUS at 08:11

## 2019-07-05 RX ADMIN — FLUTICASONE FUROATE AND VILANTEROL TRIFENATATE 1 PUFF: 200; 25 POWDER RESPIRATORY (INHALATION) at 08:11

## 2019-07-05 RX ADMIN — INSULIN LISPRO 6 UNITS: 100 INJECTION, SOLUTION INTRAVENOUS; SUBCUTANEOUS at 11:39

## 2019-07-05 RX ADMIN — DULOXETINE HYDROCHLORIDE 60 MG: 60 CAPSULE, DELAYED RELEASE ORAL at 08:12

## 2019-07-05 RX ADMIN — WARFARIN SODIUM 1.5 MG: 1 TABLET ORAL at 02:59

## 2019-07-05 RX ADMIN — LOSARTAN POTASSIUM 25 MG: 25 TABLET ORAL at 08:12

## 2019-07-05 RX ADMIN — ATORVASTATIN CALCIUM 40 MG: 20 TABLET, FILM COATED ORAL at 08:11

## 2019-07-05 RX ADMIN — OXYCODONE HYDROCHLORIDE 5 MG: 5 TABLET ORAL at 03:00

## 2019-07-05 RX ADMIN — BARIUM SULFATE 45 G: 960 POWDER, FOR SUSPENSION ORAL at 12:50

## 2019-07-05 RX ADMIN — ACETAMINOPHEN 650 MG: 325 TABLET ORAL at 03:00

## 2019-07-05 RX ADMIN — INSULIN LISPRO 8 UNITS: 100 INJECTION, SOLUTION INTRAVENOUS; SUBCUTANEOUS at 22:04

## 2019-07-05 NOTE — PROGRESS NOTES
Problem: Dysphagia (Adult)  Goal: *Acute Goals and Plan of Care (Insert Text)  Description  Recommendations:  Diet: NPO   Meds: Non-oral  Aspiration Precautions  Oral Care TID  Other: MBSS this day     Goals:  Patient will:  1. Tolerate PO trials with 0 s/s overt distress in 4/5 trials  2. Utilize compensatory swallow strategies/maneuvers (decrease bite/sip, size/rate, alt. liq/sol) with min cues in 4/5 trials  3. Perform oral-motor/laryngeal exercises to increase oropharyngeal swallow function with min cues  4. Complete an objective swallow study (i.e., MBSS) to assess swallow integrity, r/o aspiration, and determine of safest LRD, min A    Outcome: Progressing Towards Goal    SPEECH LANGUAGE PATHOLOGY BEDSIDE SWALLOW EVALUATION    Patient: Julia Odell (2 y.o. male)  Date: 7/5/2019  Primary Diagnosis: COPD exacerbation (HCC) [J44.1]  Pleural effusion [J90]        Precautions: Aspiration     PLOF: Independent    ASSESSMENT :  Based on the objective data described below, the patient presents with mild oral and suspected mod-severe pharyngeal dysphagia. PMHx of CVA. Most recent MBSS completed at [facility] with the following results; \"Gross aspiration of both honey thick and pudding thick consistencies of barium. With maneuver of turning head to left, thin, honey, and pudding consistencies were swallowed with minimal penetration and without evidence for aspiration. No epiglottic inversion is demonstrated throughout exam. Stasis within the vallecula and pyriform sinuses occur. \"     This day, A&Ox4. Oral-motor exam revealed pt with incomplete dentition however, all other structures grossly intact for mastication and deglutition. Patient reported inconsistent use of head turn when eating and stated \"I can't use straws\". Reports use of NTL with pills. Observed self-feeding thin liquid and pudding with independent use of head turn in 3/4 trials.  Pt demo cough with trials both of thin liquid and pudding even with use of head turn. No further consistencies trialed. At this time, recommend patient NPO. Repeat MBSS this day to further assess swallow integrity. Aspiration precautions. D/w RN, Claudia Capellan and Richy HERRERA. SLP will continue to follow. Patient will benefit from skilled intervention to address the above impairments. Patient's rehabilitation potential is considered to be Fair  Factors which may influence rehabilitation potential include:   ? None noted  ? Mental ability/status  ? Medical condition  ? Home/family situation and support systems  ? Safety awareness  ? Pain tolerance/management  ? Other:      PLAN :  Recommendations and Planned Interventions:  NPO, MBSS  Frequency/Duration: Patient will be followed by speech-language pathology 1-2 times per day/4-7 days per week to address goals. Discharge Recommendations: To Be Determined     SUBJECTIVE:   Patient stated ? I'll do test again, just for you? .    OBJECTIVE:     Past Medical History:   Diagnosis Date    A-fib ([facility] [address]     CAD (coronary artery disease)     Diabetes ([facility] [address]     Pleural effusion      Past Surgical History:   Procedure Laterality Date    CARDIAC SURG PROCEDURE UNLIST      double bypass     Home Situation:   Home Situation  Home Environment: Apartment  # Steps to Enter: 16  One/Two Story Residence: Two story(lives on 2nd floor)  # of Interior Steps: 0  Interior Rails: Left  Lift Chair Available: No  Living Alone: Yes  Support Systems: Hinduism / sergio community  Patient Expects to be Discharged to[de-identified] Other (comment)(TBD)  Current DME Used/Available at Home: Oxygen, portable, Wheelchair, Walker, rolling, Cane, quad, Glucometer, Eugene-Waldron, Shower chair, Raised toilet seat, Walker    Diet prior to admission: Mech-soft/thin  Current Diet:  NPO     Cognitive and Communication Status:  Neurologic State: Alert  Orientation Level: Oriented X4  Cognition: Follows commands  Perception: Appears intact  Perseveration: No perseveration noted  Safety/Judgement: Awareness of environment  Oral Assessment:  Oral Assessment  Labial: No impairment  Dentition: Intact  Oral Hygiene: Fair  Lingual: No impairment  Velum: No impairment  Mandible: No impairment  P.O. Trials:  Patient Position: EOB  Vocal quality prior to P.O.: No impairment  Consistency Presented: Thin liquid;Pudding; Solid  How Presented: Self-fed/presented;Cup/sip     Bolus Acceptance: No impairment  Bolus Formation/Control: No impairment     Propulsion: No impairment  Oral Residue: None  Initiation of Swallow: No impairment  Laryngeal Elevation: Functional  Aspiration Signs/Symptoms: Strong cough  Pharyngeal Phase Characteristics: Suspected pharyngeal residue  Effective Modifications: Left head turn  Cues for Modifications: Minimal       Oral Phase Severity: Mild  Pharyngeal Phase Severity : Moderate-severe    PAIN:  Pain level pre-treatment: 0/10   Pain level post-treatment: 0/10     After treatment:   ?            Patient left in no apparent distress sitting up in chair  ? Patient left in no apparent distress in bed  ? Call bell left within reach  ? Nursing notified  ? Family present  ? Caregiver present  ? Bed alarm activated    COMMUNICATION/EDUCATION:   ?            Aspiration precautions; swallow safety; compensatory techniques. ?            Patient/family have participated as able in goal setting and plan of care. ?            Patient/family agree to work toward stated goals and plan of care. ?            Patient understands intent and goals of therapy; neutral about participation. ? Patient unable to participate in goal setting/plan of care; educ ongoing with interdisciplinary staff  ? Posted safety precautions in patient's room.     Thank you for this referral,  Arron Carlos SLP  Time Calculation: 12 mins

## 2019-07-05 NOTE — PROGRESS NOTES
Problem: Dysphagia (Adult)  Goal: *Acute Goals and Plan of Care (Insert Text)  Description  Recommendations:  Diet: Soft solid/thin liquid    Meds: Crushed in puree  STRICT Aspiration Precautions  Oral Care TID  Left head turn with all swallows  Alternate solid/liquid     Goals:  Patient will:  1. Tolerate PO trials with 0 s/s overt distress in 4/5 trials  2. Utilize compensatory swallow strategies/maneuvers (decrease bite/sip, size/rate, alt. liq/sol) with min cues in 4/5 trials  3. Perform oral-motor/laryngeal exercises to increase oropharyngeal swallow function with min cues  4. Complete an objective swallow study (i.e., MBSS) to assess swallow integrity, r/o aspiration, and determine of safest LRD, min A (goal met 7/5)   7/5/2019 1318 by Chandra Dow SLP  Outcome: Progressing Towards Goal    SPEECH PATHOLOGY MODIFIED BARIUM SWALLOW STUDY & TREATMENT    Patient: Vinay Ayon (60 y.o. male)  Date: 7/5/2019  Primary Diagnosis: COPD exacerbation (HCC) [J44.1]  Pleural effusion [J90]  Weakness [R53.1]        Precautions: Aspiration     PLOF: Rehab    ASSESSMENT :  Modified Barium Swallow Study completed with overt aspiration of thin liquid via cup sip. No aspiration occurred with thin liquid via cup, pudding or solids when patient swallowed with head turned left. No epiglottic inversion is demonstrated throughout study, resulting in vallecular residue with all trials. Residue reduced with alternating solid/liquid. Deficits include decreased hyolaryngeal excursion, no movement of epiglottis and mildly delayed mastication. Based on the objective data described above, the patient presents with mild oral and mod-severe pharyngeal dysphagia. which places pt at risk for aspiration. At this time, safest for soft solid, thin liquid diet. Patient must turn head to the left and alternate solid/liquid. STRICT aspiration precautions. D/w RN, Sienna.      Treatment:  Skilled therapy initiated: Educated pt on MBS results, aspiration precautions, and importance of compensatory swallow techniques to decrease aspiration risk (decrease rate of intake & sip/bite size, upright @HOB for all po intake and ~30 minutes after po); verbalized comprehension. SLP to follow as indicated. Patient will benefit from skilled intervention to address the above impairments. Patient's rehabilitation potential is considered to be Fair  Factors which may influence rehabilitation potential include:   ? None noted  ? Mental ability/status  ? Medical condition  ? Home/family situation and support systems  ? Safety awareness  ? Pain tolerance/management  ? Other:      PLAN :  Recommendations and Planned Interventions:  Soft solid/thin liquid  Frequency/Duration: Patient will be followed by speech-language pathology 1-2 times per day/4-7 days per week to address goals. Discharge Recommendations: Home Health     SUBJECTIVE:   Patient stated ? I understand? .    OBJECTIVE:     Past Medical History:   Diagnosis Date    A-fib ([facility] [address]     CAD (coronary artery disease)     Diabetes ([facility] [address]     Pleural effusion      Past Surgical History:   Procedure Laterality Date    CARDIAC SURG PROCEDURE UNLIST      double bypass     Home Situation:   Home Situation  Home Environment: Apartment  # Steps to Enter: 16  One/Two Story Residence: Two story(lives on 2nd floor)  # of Interior Steps: 0  Interior Rails: Left  Lift Chair Available: No  Living Alone: Yes  Support Systems: [employer] [facility]  Patient Expects to be Discharged to[de-identified] Other (comment)(TBD)  Current DME Used/Available at Home: Oxygen, portable, Wheelchair, Walker, rolling, Cane, quad, Glucometer, [place], Shower chair, Raised toilet seat, Walker  Diet prior to admission: Soft solid/thin liquid   Current Diet: Soft solid/thin liquid    Radiologist:  Company Views: Lateral  Patient Position: Chair 90    Trial 1: Consistency Presented: Thin liquid; Solid;Pudding   How Presented: Self-fed/presented;Cup/sip; Successive swallows       Bolus Acceptance: No impairment   Bolus Formation/Control: Impaired: Delayed;Mastication   Propulsion: No impairment   Oral Residue: None   Initiation of Swallow: Triggered at vallecula   Timing: Pooling 1-5 sec;Pyriform sinus;Vallecular   Penetration: None   Aspiration/Timing: During;From initial swallow   Pharyngeal Clearance: 10-50%; Vallecular residue   Attempted Modifications: Alternate liquids/solids;Small sips and bites; Left head turn;Cup/sip   Effective Modifications: Alternate liquids/solids; Left head turn;Small sips and bites;Cup/sip   Cues for Modifications: Minimal         Decreased Tongue Base Retraction?: No  Laryngeal Elevation: Inadequate epiglottic inversion;Minimal movement of larynx/epiglottis  Aspiration/Penetration Score: 6 (Aspiration-Contrast passes below the cords/glottis, and is cleared)   Pharyngeal Symmetry: Not assessed  Pharyngeal-Esophageal Segment: No impairment  Pharyngeal Dysfunction: Decreased elevation/closure    Oral Phase Severity: Mild  Pharyngeal Phase Severity: Moderately severe    PAIN:  Pain level pre-treatment: 0/10   Pain level post-treatment: 0/10     COMMUNICATION/EDUCATION:   ?  Patient educated regarding MBS results and diet recommendations. ?  Patient/family have participated as able in goal setting and plan of care. ?  Patient/family agree to work toward stated goals and plan of care. ?  Patient understands intent and goals of therapy, but is neutral about his/her participation. ? Patient is unable to participate in goal setting and plan of care.     Thank you for this referral.  CAROLYNN Vallejo  Time Calculation: 27 mins  MBS Time: 15 minutes  Treatment Time: 12 minutes

## 2019-07-05 NOTE — PROGRESS NOTES
Speech Therapy Note:    Modified barium swallow study completed with aspiration of thin liquid and significant vallecular residue with all consistencies. Patient safest for soft solid, thin liquid diet with aspiration precautions and the following compensatory strategies: head turn left and alternate bite/sip. Full report to follow.      Sandy Holley M.S., [address] [facility]  Speech Language Pathologist

## 2019-07-05 NOTE — PROGRESS NOTES
Reason for Admission:   PE; COPD exacerbation; weakness                  RRAT Score:    Mod; 16              Do you (patient/family) have any concerns for transition/discharge? Plan for utilizing home health:   N/A    Current Advanced Directive/Advance Care Plan:  DNR            Transition of Care Plan:    SNF      Chart reviewed. Per H&P Giovana Florian Alix Boston is a 69 y.o. male who came in by ambulance because he couldn't take care of himself after getting into his apartment yesterday after being discharged from rehab. He has been using a wheelchair or a walker but the apartment that he rented is 2 stories. The medics had to carry him up the second story when he moved in and he was not strong enough to climb the stairs on his own. He also realized he couldn't cook or take care of himself so called EMS to bring him to the hospital. He has a sister in [place] and a niece in [place] who want him to consider assisted living or nursing home. No family locally but his DPOA is Iraida Norwood (friend) [phone]. \"    CM met with pt at bedside to discuss transition of care. Pt indicated he was recently discharged from [address] [place] [address] at [facility] and was there approximately 25 days. Pt has indicated he would need continued rehab as he lives alone. Pt has indicated he does not wish to return to [facility]. CM offered a list of area facilities to refer to. Pt has selected [facility] and [place]. CMS has been notified to assist.  Pt is agreeable to a UAI screening. CM to complete a UAI to assist with transition of care. CM awaiting an accepting facility. CM continuing to follow and assist.    1500:  CM met with pt and his friend, Millicent Crawford ([phone]), at bedside to further discuss transition of care needs. Pt and friend are agreeable to SNF placement. CM further discussed the possibility of pt not being able to return home from a SNF setting. Pt and friend verbalized an understanding of this.   CM discussed searching the , \[facility]\"", [place], [place] and [place] area to check on bed availability for continuity of care. CMS has been notified to assist.  Pt would also like to have the [place] review for placement as well. CMS has been notified to assist.  CM continuing to follow and assist with SNF placement. Care Management Interventions  PCP Verified by CM:  Yes  Transition of Care Consult (CM Consult): SNF  Partner SNF: No  Health Maintenance Reviewed: Yes  Physical Therapy Consult: Yes  Speech Therapy Consult: Yes  Current Support Network: Lives Alone  Plan discussed with Pt/Family/Caregiver: Yes  Freedom of Choice Offered: Yes  Discharge Location  Discharge Placement: Skilled nursing facility

## 2019-07-05 NOTE — PROGRESS NOTES
Hospitalist Progress Note    Patient: Eileen Cornejo MRN: [id number]  CSN: [id number]    YOB: 1945  Age: 69 y.o. Sex: male    DOA: 7/4/2019 LOS:  LOS: 0 days          Chief Complaint:    Weakness    Assessment/Plan     1. Weakness  2. Chronic home oxygen use  3. Atrial fibrillation  4. Coronary artery disease  5. NIDDM2  6. History of dysphagia    1. Consult placed to physical therapy. Patient recently discharged from rehab the day of admission. Await recommendations. Patient states he feels that he was discharged too early from SNF and is refusing to return home from hospital.  2. Patient on his usual home oxygen requirements without complaints of SOB at time of exam.  3. Continue coumadin, dosed per pharmacy. Rate is controlled, no changes. 4. Patient states he is no longer taking aspirin. 5. Continue current sliding scale insulin and monitor. Patient is currently NPO and will have modified barium swallow with SLP. Diet recommendations afterwards. 6. As above, evaluated by SLP and will have a repeat MBS. NPO until MBS has been completed. Patient had a loose stool earlier this AM, patient placed on enteric contact precautions for rule out C.diff. C.diff sample sent. Await results. DVT prophylaxis with coumadin, dosed by pharmacy. Disposition: Await physical therapy evaluation. Wait for MBS with SLP. May need assisted living placement upon discharge. Patient Active Problem List   Diagnosis Code    Pleural effusion J90    COPD exacerbation ([facility]) J44.1    Weakness R53.1    A-fib ([facility]) I48.91    Diabetes ([facility] [address] E11.9    CAD (coronary artery disease) I25.10       Subjective:    No complaints. Does not wish to return home.     Review of systems:    Constitutional: denies fevers, chills, myalgias  Respiratory: denies SOB, cough  Cardiovascular: denies chest pain, palpitations  Gastrointestinal: denies nausea, vomiting, diarrhea      Vital signs/Intake and Output:  Visit Vitals  BP 137/75 (BP 1 Location: Right arm, BP Patient Position: At rest;Supine)   Pulse 87   Temp 97.7 °F (36.5 °C)   Resp 20   Ht 5' 5\" (1.651 m)   Wt 48.5 kg (107 lb)   SpO2 100%   BMI 17.81 kg/m²     Current Shift:  No intake/output data recorded. Last three shifts:  07/03 1901 - 07/05 0700  In: 300 [I.V.:300]  Out: -     Exam:    General: Elderly white male, alert, NAD, OX3  Head/Neck: NCAT, supple, No masses, No lymphadenopathy  CVS:Regular rate and rhythm, no M/R/G, S1/S2 heard, no thrill  Lungs:Clear to auscultation bilaterally, no wheezes, rhonchi, or rales  Abdomen: Soft, Nontender, No distention, Normal Bowel sounds, No hepatomegaly  Extremities: No C/C/E, pulses palpable 2+  Skin:normal texture and turgor, no rashes, no lesions  Neuro:grossly normal , follows commands  Psych:appropriate                Labs: Results:       Chemistry Recent Labs     07/04/19 2036   *      K 4.1   CL 92*   CO2 40*   BUN 15   CREA 0.59*   CA 9.8   AGAP 6   BUCR 25*   *   TP 7.0   ALB 2.9*   GLOB 4.1*   AGRAT 0.7*      CBC w/Diff Recent Labs     07/04/19 2036   WBC 12.5   RBC 3.37*   HGB 10.4*   HCT 34.0*   *   GRANS 84*   LYMPH 11*   EOS 0      Cardiac Enzymes Recent Labs     07/05/19  0806 07/05/19 0210   CPK 30* 29*   CKND1 7.7* 6.9*      Coagulation Recent Labs     07/05/19 0210 07/04/19 2036   PTP 13.9 13.8   INR 1.1 1.1       Lipid Panel No results found for: CHOL, CHOLPOCT, CHOLX, CHLST, CHOLV, 592449, HDL, LDL, LDLC, DLDLP, 759425, VLDLC, VLDL, TGLX, TRIGL, TRIGP, TGLPOCT, CHHD, CHHDX   BNP No results for input(s): BNPP in the last 72 hours.    Liver Enzymes Recent Labs     07/04/19 2036   TP 7.0   ALB 2.9*   *   SGOT 22      Thyroid Studies No results found for: T4, T3U, TSH, TSHEXT     Procedures/imaging: see electronic medical records for all procedures/Xrays and details which were not copied into this note but were reviewed prior to creation of [address] Shawn Cunningham, PA-C

## 2019-07-05 NOTE — PROGRESS NOTES
Pharmacy Dosing Services: Warfarin    Consult for Warfarin Dosing by Pharmacy by Dr. Deniz Hinds provided for this 69 y.o.  male , for indication of Atrial Fibrillation. Day of Therapy (continuation from home regimen)  Dose to achieve an INR goal of 2-3    Pt received Warfarin 1.5 mg po once, administered 7/5/19 at 02:59 am  Addition Warfarin 1.5 mg ordered for today at 18:00. Significant drug interactions: none noted  LABS    PT/INR Lab Results   Component Value Date/Time    INR 1.1 07/05/2019 02:10 AM        Platelets Lab Results   Component Value Date/Time    PLATELET 629 (H) 93/53/4936 08:36 PM        H/H     Lab Results   Component Value Date/Time    HGB 10.4 (L) 07/04/2019 08:36 PM          Warfarin Administrations (last 168 hours)       Date/Time Action Medication Dose    07/05/19 0259 Given    warfarin (COUMADIN) tablet 1.5 mg 1.5 mg          Pharmacy to follow daily and will provide subsequent Warfarin dosing based on clinical status.   TAJ Arellano  Contact information [phone]

## 2019-07-05 NOTE — PROGRESS NOTES
Received bedside report from night shift RN. Patient resting quietly in bed, no complaint of pain. Patient continuing enteric contact precautions to rule out C-Diff. Patient on 2 LPM NC chronic oxygen. Patient took PO meds in thickened applejuice without difficulty. He ate approximately 40% of breakfast without difficulty. Stating it was the best food he had in two years. 0900 speech in to assess patient. Patient NPO, he is to have barium swallow test.    1213 patient taken down for barium swallow. 1252 patient arrived back on floor. Patient on soft solid diet. James Salazar from [facility] at [place] called, stated patient is negative for C-Diff. Contact precautions removed. Bedside and Verbal shift change report given to Angela Gao RN (oncoming nurse) by Mauro Alicea RN (offgoing nurse). Report included the following information SBAR, Kardex, Intake/Output, MAR and Recent Results.

## 2019-07-05 NOTE — PROGRESS NOTES
Pharmacy Dosing Services: Warfarin    Consult for Warfarin Dosing by Pharmacy by Dr. Kya King provided for this 69 y.o.  male , for indication of Atrial Fibrillation. Day of Therapy: continued on admission   Dose to achieve an INR goal of 2-3    Order entered for  Warfarin  1.5 mg ordered to be given today at 0300. Significant drug interactions: None  LABS    PT/INR Lab Results   Component Value Date/Time    INR 1.1 07/05/2019 02:10 AM        Platelets Lab Results   Component Value Date/Time    PLATELET 999 (H) 23/54/0266 08:36 PM        H/H     Lab Results   Component Value Date/Time    HGB 10.4 (L) 07/04/2019 08:36 PM          Warfarin Administrations (last 168 hours)       Date/Time Action Medication Dose    07/05/19 0259 Given    warfarin (COUMADIN) tablet 1.5 mg 1.5 mg          PTA dose was Warfarin 1 mg nightly     Pharmacy to follow daily and will provide subsequent Warfarin dosing based on clinical status.   [facility], [facility])  Contact information [phone]

## 2019-07-05 NOTE — ED TRIAGE NOTES
Pt arrived via EMS with c/o shortness of breath. Pt has hx of lobectomy and recently had 4L of fluid removed. Pt was in [facility] for 1 month and in rehab for 25 days. Pt was d/c from rehab yesterday and states he felt SOB when he was d/c, which has become worse today.  Pt is chronically on 2L O2 via NC.

## 2019-07-05 NOTE — ROUTINE PROCESS
TRANSFER - IN REPORT: 
 
Verbal report received from Christopher Mena RN(name) on Charlene Vaughan  being received from ER(unit) for routine progression of care Report consisted of patients Situation, Background, Assessment and  
Recommendations(SBAR). Information from the following report(s) SBAR, Kardex, ED Summary, Intake/Output, MAR and Recent Results was reviewed with the receiving nurse. Opportunity for questions and clarification was provided. Assessment completed upon patients arrival to unit and care assumed.

## 2019-07-05 NOTE — ED NOTES
TRANSFER - OUT REPORT:    Verbal report given to Nirmal RN(name) on Anastasia Valle  being transferred to Medical(unit) for routine progression of care       Report consisted of patients Situation, Background, Assessment and   Recommendations(SBAR). Information from the following report(s) ED Summary was reviewed with the receiving nurse. Lines:   Peripheral IV 07/04/19 Left Antecubital (Active)   Site Assessment Clean, dry, & intact 7/4/2019  8:40 PM   Phlebitis Assessment 0 7/4/2019  8:40 PM   Infiltration Assessment 0 7/4/2019  8:40 PM   Dressing Status Clean, dry, & intact 7/4/2019  8:40 PM   Dressing Type Transparent 7/4/2019  8:40 PM   Hub Color/Line Status Pink 7/4/2019  8:40 PM   Alcohol Cap Used Yes 7/4/2019  8:40 PM        Opportunity for questions and clarification was provided.       Patient transported with:   [facility]

## 2019-07-05 NOTE — PROGRESS NOTES
Assisted pt to bedside commode, voided and had a bowel movement. Stool sent for C. Diff per Dr. Ab Alvarenga. 2753 Menu provided, Mepilex applied to sacrum for protection, SCD's on. Continuous pulse Ox as ordered. Passed on in report that pt prefer taking pills with thickened juice but no difficulty noted when drinking water with straw. No cough noted.  Left pt resting quietly in  Bed, call light within reach

## 2019-07-05 NOTE — PROGRESS NOTES
19:40 Assessment completed. O2 remains @ 2 LPM per NC. Lungs are clear bilat. Resting quietly in bed talking on the phone. 22:45 Shift assessment completed. See nsg flow sheet for details. 03:05  Reassessed with 0 changes noted. Resting quietly in bed with eyes closed between cares. 07:55 Bedside and Verbal shift change report given to Renetta Dickerson (oncoming nurse) by Allan Marcos RN (offgoing nurse). Report included the following information SBAR.

## 2019-07-05 NOTE — PROGRESS NOTES
INITIAL NUTRITION ASSESSMENT     RECOMMENDATIONS/PLAN:   Advance diet  Will follow up for supplement preferences once diet is advanced  Monitor weight and fluid status  Monitor skin integrity, bowel function, labs/lytes    REASON FOR ASSESSMENT:   [x]  MD Consult:    [x] General Nutrition Management and Supplements   [] Management of Tube Feeding   [] Calorie Count    [] Diet Education  [x]  RN Referral:    [x] MST score >/=2  Malnutrition Screening Tool (MST):  Recently Lost Weight Without Trying: Yes  If Yes, How Much Weight Loss: >33 lbs  Eating Poorly Due to Decreased Appetite: Yes  MST Score: 5    [] Enteral/Parenteral Nutrition PTA   [] Pregnant (Not in Labor):  [] Gestational DM     [] Multigestation   [] Pressure Ulcer/Wound Care needs  [x] Positive Nutrition Screen:  [] BMI <19  [] NPO/Clear Liquid Diet > 5 days (>3 days in ICU)  [] New Order for TPN    NUTRITION ASSESSMENT:   Client History: 69 yrs old Male admitted with c/o SOB. Pt with PE, COPD, and weakness. MD consult nutrition secondary to pt with recent hospitalizations and rehab stay, also noted per MD ensure and Amna Jocelyn is not tolerated by pt. MBS noted with aspiration, SLP recc NPO for now. PMHx: recent hospitalization with rehab stay, a fib, CAD, DM, PE  Cultural/Sikhism Food Preferences: None Identified    FOOD/NUTRITION HISTORY  Diet History: nutritional deficits likely PTA  Food Allergies:  [x] NKFA       Pertinent PTA Medications: lispro, colace, ferrous sulfate, miralax, januvia, MVI+M     NUTRITION INTAKE   Diet Order:  NPO  Average PO Intake:       No data found.    Pertinent Medications:  [x] Reviewed; lispro, ferrous sulfate, MVI+M, prilosec   Electrolyte Replacement Protocol: []K  []Mg  []PO4    Insulin:  [] SSI  [x] Pre-meal   []  Basal   [] Drip  [] None  Pt expected to meet estimated nutrient needs through next review:          []  Yes     [x] No; npo cannot provide for estimated needs ANTHROPOMETRICS  Height: 5' 5\" (165.1 cm) Weight: 48.5 kg (107 lb)    BMI: 17.8 kg/m^2  -  underweight (Less than or = to 18.5% BMI)        Weight change: significant wt loss of approx 17lbs, 13% body weight x 1 month                                  Comparison to Reference Standards:  IBW: 136 lbs      %IBW: 79%      AdjBW: n/a kg    NUTRITION-FOCUSED PHYSICAL ASSESSMENT  Skin: mild redness to sacrum per documentation  GI: +BM loose; watery    BIOCHEMICAL DATA & MEDICAL TESTS  Pertinent Labs:  [x] Reviewed; hemoglobin a1c-6.5     NUTRITION PRESCRIPTION  Calories: 1715 kcal/day based on 35kcal/kg  Protein: 59 g/day based on 1.2 g/kg  CHO: 214 g/day based on 50% of total energy  Fluid: 1715 ml/day based on 1 kcal/ml      NUTRITION DIAGNOSES:   1. Inadequate oral intake related to dysphagia/difficulty swallowing as evidenced by wt loss as noted above and aspiration noted on MBS    NUTRITION INTERVENTIONS:   INTERVENTIONS:     GOALS:  1. Other: adv diet as soon as clinically feasible 1. Diet adv within the next 2 days     LEARNING NEEDS (Diet, Supplementation, Food/Nutrient-Drug Interaction):   [x] None Identified  [] Inpatient education provided/documented    [] Identified and patient:  [] Declined     [] Was not appropriate/indicated  NUTRITION MONITORING /EVALUATION:   Follow PO intake  Monitor wt  Monitor renal labs, electrolytes, fluid status  Monitor for additional supplement needs    [] Participated in Interdisciplinary Rounds  [x] Interdisciplinary Care Plan Reviewed/Documented  DISCHARGE NUTRITION RECOMMENDATIONS ADDRESSED:        [x] To be determined closer to discharge    NUTRITION RISK:     [x]  At risk                     []  Not currently at risk     Will follow-up per policy.   Nacho Elliott 1

## 2019-07-05 NOTE — ROUTINE PROCESS
Bedside and Verbal shift change report given to LETICIA Germain (oncoming nurse) by Lacey Red RN 
 (offgoing nurse). Report included the following information SBAR, Kardex, Intake/Output, MAR and Recent Results.

## 2019-07-05 NOTE — H&P
History & Physical    Patient: Demi Moreno MRN: [id number]  CSN: [id number]    YOB: 1945  Age: 69 y.o. Sex: male      DOA: 7/4/2019  Primary Care Provider:  SHARON Black      Assessment/Plan     Patient Active Problem List   Diagnosis Code    Pleural effusion J90    COPD exacerbation ([facility] [address] J44.1    Weakness R53.1       68 y/o male with 2 recent hospital admissions and a month long stint in rehab at [facility] who did not do well trying to live alone in an apartment and is admitted for weakness and debility. He felt short of breath climbing the stairs and weak as he didn't do much physical conditioning before returning home yesterday. He is admitted to consider placement options (nursing home vs. assisted living). He is doing well on his baseline home O2 of 2 L NC and I do not think he has a COPD exacerbation. He has a history of pleurodesis but nothing new on imaging and has been feeling well since discharge from rehab yesterday.     -case management consult  -nutrition consult  -trend troponins to ensure neg  -recheck abd in morning to ensure nontender (did not complain of abd pain but noted it when I examined him)  -sliding scale insulin    Prophy-SCDs, coumadin, prilosec    DNR    Estimated length of stay : 2 nights    Scott Simpson MD  Nocturnist    -------------------------------------------------------------------------------------------------------------------------------------------    CC: \"I can't take care of myself\"       HPI:     Demi Moreno is a 69 y.o. male who came in by ambulance because he couldn't take care of himself after getting into his apartment yesterday after being discharged from rehab. He has been using a wheelchair or a walker but the apartment that he rented is 2 stories. The medics had to carry him up the second story when he moved in and he was not strong enough to climb the stairs on his own.  He also realized he couldn't cook or take care of himself so called EMS to bring him to the hospital. He has a sister in [place] and a niece in [address] [place] [address] who want him to consider assisted living or nursing home. No family locally but his DPOA is Margarette Ramsey (friend) [phone]. Past Medical History:   Diagnosis Date    A-fib [facility])     CAD (coronary artery disease)     Diabetes ([address]     Pleural effusion        Past Surgical History:   Procedure Laterality Date    CARDIAC SURG PROCEDURE UNLIST      double bypass       History reviewed. No pertinent family history. Social History     Socioeconomic History    Marital status: LEGALLY      Spouse name: Not on file    Number of children: Not on file    Years of education: Not on file    Highest education level: Not on file   Tobacco Use    Smoking status: Former Smoker    Smokeless tobacco: Never Used   Substance and Sexual Activity    Alcohol use: No     Frequency: Never    Drug use: No       Prior to Admission medications    Medication Sig Start Date End Date Taking? Authorizing Provider   ondansetron hcl (ZOFRAN) 4 mg tablet Take 1 Tab by mouth every eight (8) hours as needed for Nausea. 1/2/19   Millie Jones MD   warfarin (COUMADIN) 2 mg tablet Take 2 mg by mouth four (4) days a week. Jr Miller MD   warfarin (COUMADIN) 3 mg tablet Take 3 mg by mouth Three (3) times a week. Jr Miller MD   albuterol (PROVENTIL HFA, VENTOLIN HFA, PROAIR HFA) 90 mcg/actuation inhaler Take  by inhalation. Jr Miller MD   aspirin 81 mg chewable tablet Take 81 mg by mouth daily. Jr Miller MD   atorvastatin (LIPITOR) 40 mg tablet Take 40 mg by mouth daily. Jr Miller MD   buPROPion XL (WELLBUTRIN XL) 300 mg XL tablet Take 300 mg by mouth every morning. Jr Miller MD   docusate sodium (COLACE) 100 mg capsule Take 100 mg by mouth two (2) times a day. Jr Miller MD   donepezil (ARICEPT) 10 mg tablet Take 10 mg by mouth nightly.     Jr Miller MD   DULoxetine (CYMBALTA) 60 mg capsule Take 60 mg by mouth daily. Jr Miller MD   ferrous sulfate 325 mg (65 mg iron) cpER Take  by mouth. Jr Miller MD   fluticasone-salmeterol (ADVAIR DISKUS) 500-50 mcg/dose diskus inhaler Take 1 Puff by inhalation every twelve (12) hours. Jr Miller MD   HYDROcodone-acetaminophen (NORCO) 5-325 mg per tablet Take  by mouth. Jr Miller MD   insulin lispro (HUMALOG) 100 unit/mL kwikpen by SubCUTAneous route. Jr Miller MD   losartan (COZAAR) 25 mg tablet Take 25 mg by mouth daily. Jr Miller MD   omeprazole (PRILOSEC) 20 mg capsule Take 20 mg by mouth daily. Jr Miller MD   lidocaine (LIDODERM) 5 % by TransDERmal route every twenty-four (24) hours. Apply patch to the affected area for 12 hours a day and remove for 12 hours a day. Jr Miller MD   polyethylene glycol (MIRALAX) 17 gram/dose powder Take 17 g by mouth daily. Jr Miller MD   repaglinide (PRANDIN) 0.5 mg tablet Take 0.5 mg by mouth Before breakfast, lunch, and dinner. Jr Miller MD   SITagliptin (JANUVIA) 100 mg tablet Take 100 mg by mouth daily. Jr Miller MD   tamsulosin (FLOMAX) 0.4 mg capsule Take 0.4 mg by mouth daily. Jr Miller MD   multivitamin (ONE A DAY) tablet Take 1 Tab by mouth daily. Jr Miller MD       No Known Allergies    Review of Systems  Gen: No fever, chills, malaise, weight loss/gain. Heent: No headache, rhinorrhea, epistaxis, ear pain, hearing loss, sinus pain, neck pain/stiffness, sore throat. Heart: No chest pain, palpitations, BANSAL, pnd, or orthopnea. Resp: No cough, hemoptysis, wheezing and shortness of breath. GI: No nausea, vomiting, diarrhea, constipation, melena or hematochezia. : No urinary obstruction, dysuria or hematuria. Derm: No rash, new skin lesion or pruritis. Musc/skeletal: no bone or joint complaints. Vasc: No edema, cyanosis or claudication. Endo: No heat/cold intolerance, no polyuria,polydipsia or polyphagia. Neuro:  No unilateral weakness, numbness, tingling. No seizures. Heme: No easy bruising or bleeding. Physical Exam:     Physical Exam:  Visit Vitals  /75 (BP 1 Location: Right arm, BP Patient Position: At rest;Supine)   Pulse 87   Temp 97.7 °F (36.5 °C)   Resp 20   Ht 5' 5\" (1.651 m)   Wt 48.5 kg (107 lb)   SpO2 100%   BMI 17.81 kg/m²    O2 Flow Rate (L/min): 3 l/min O2 Device: Nasal cannula    Temp (24hrs), Av.1 °F (36.7 °C), Min:97.7 °F (36.5 °C), Max:98.5 °F (36.9 °C)    No intake/output data recorded. No intake/output data recorded. General:  Awake, cooperative, no distress. Head:  Normocephalic, without obvious abnormality, atraumatic. Eyes:  Conjunctivae/corneas clear, sclera anicteric. Neck: Supple, symmetrical, trachea midline, no adenopathy. Lungs:   Clear to auscultation bilaterally. Heart:  Regular rate and rhythm, S1, S2 normal, no murmur, click, rub or gallop. Abdomen: Soft, mild tenderness over epigastric region, no rebound/guarding. Bowel sounds normal. No masses,  No organomegaly. Extremities: Extremities normal, atraumatic, no cyanosis or edema. Capillary refill normal.   Pulses: 2+ and symmetric all extremities. Skin: Skin color pink, turgor normal. No rashes or lesions   Neurologic: No focal motor or sensory deficit. Labs Reviewed: All lab results for the last 24 hours reviewed.   Recent Results (from the past 24 hour(s))   CBC WITH AUTOMATED DIFF    Collection Time: 19  8:36 PM   Result Value Ref Range    WBC 12.5 4.6 - 13.2 K/uL    RBC 3.37 (L) 4.70 - 5.50 M/uL    HGB 10.4 (L) 13.0 - 16.0 g/dL    HCT 34.0 (L) 36.0 - 48.0 %    .9 (H) 74.0 - 97.0 FL    MCH 30.9 24.0 - 34.0 PG    MCHC 30.6 (L) 31.0 - 37.0 g/dL    RDW 19.6 (H) 11.6 - 14.5 %    PLATELET 738 (H) 166 - 420 K/uL    MPV 10.4 9.2 - 11.8 FL    NEUTROPHILS 84 (H) 42 - 75 %    LYMPHOCYTES 11 (L) 20 - 51 %    MONOCYTES 4 2 - 9 %    EOSINOPHILS 0 0 - 5 %    BASOPHILS 1 0 - 3 % ABS. NEUTROPHILS 10.5 (H) 1.8 - 8.0 K/UL    ABS. LYMPHOCYTES 1.4 0.8 - 3.5 K/UL    ABS. MONOCYTES 0.5 0 - 1.0 K/UL    ABS. EOSINOPHILS 0.0 0.0 - 0.4 K/UL    ABS. BASOPHILS 0.1 0.0 - 0.1 K/UL    RBC COMMENTS ANISOCYTOSIS  2+        RBC COMMENTS POLYCHROMASIA  1+        RBC COMMENTS OVALOCYTES  1+        RBC COMMENTS STOMATOCYTES  1+        DF MANUAL     METABOLIC PANEL, COMPREHENSIVE    Collection Time: 07/04/19  8:36 PM   Result Value Ref Range    Sodium 138 136 - 145 mmol/L    Potassium 4.1 3.5 - 5.5 mmol/L    Chloride 92 (L) 100 - 108 mmol/L    CO2 40 (H) 21 - 32 mmol/L    Anion gap 6 3.0 - 18 mmol/L    Glucose 233 (H) 74 - 99 mg/dL    BUN 15 7.0 - 18 MG/DL    Creatinine 0.59 (L) 0.6 - 1.3 MG/DL    BUN/Creatinine ratio 25 (H) 12 - 20      GFR est AA >60 >60 ml/min/1.73m2    GFR est non-AA >60 >60 ml/min/1.73m2    Calcium 9.8 8.5 - 10.1 MG/DL    Bilirubin, total 0.8 0.2 - 1.0 MG/DL    ALT (SGPT) 23 16 - 61 U/L    AST (SGOT) 22 15 - 37 U/L    Alk.  phosphatase 139 (H) 45 - 117 U/L    Protein, total 7.0 6.4 - 8.2 g/dL    Albumin 2.9 (L) 3.4 - 5.0 g/dL    Globulin 4.1 (H) 2.0 - 4.0 g/dL    A-G Ratio 0.7 (L) 0.8 - 1.7     PROTHROMBIN TIME + INR    Collection Time: 07/04/19  8:36 PM   Result Value Ref Range    Prothrombin time 13.8 11.5 - 15.2 sec    INR 1.1 0.8 - 1.2     CARDIAC PANEL,(CK, CKMB & TROPONIN)    Collection Time: 07/04/19  8:36 PM   Result Value Ref Range    CK 39 39 - 308 U/L    CK - MB 2.8 <3.6 ng/ml    CK-MB Index 7.2 (H) 0.0 - 4.0 %    Troponin-I, QT <0.02 0.0 - 0.045 NG/ML   POC LACTIC ACID    Collection Time: 07/04/19  8:42 PM   Result Value Ref Range    Lactic Acid (POC) 0.90 0.40 - 2.00 mmol/L   NT-PRO BNP    Collection Time: 07/04/19  8:48 PM   Result Value Ref Range    NT pro- 0 - 900 PG/ML   POC G3    Collection Time: 07/04/19  8:58 PM   Result Value Ref Range    Device: NASAL CANNULA      Flow rate (POC) 3 L/M    FIO2 (POC) 32 %    pH (POC) 7.479 (H) 7.35 - 7.45      pCO2 (POC) 58.2 (H) 35.0 - 45.0 MMHG    pO2 (POC) 120 (H) 80 - 100 MMHG    HCO3 (POC) 43.2 (H) 22 - 26 MMOL/L    sO2 (POC) 99 (H) 92 - 97 %    Base excess (POC) 20 mmol/L    Allens test (POC) YES      Total resp. rate 20      Site RIGHT RADIAL      Patient temp. 98.5      Specimen type (POC) ARTERIAL      Performed by Kiel Reno        Results   XR CHEST PORT (Accession [id number]) (Order [id number])   Allergies      No Known Allergies   Exam Information     Status Exam Begun  Exam Ended    Final [99] 7/04/2019 21:56 7/04/2019 22:11   Result Information     Status: Final result (Exam End: 7/4/2019 22:11) Provider Status: Open   Study Result     EXAM: CHEST RADIOGRAPH     CLINICAL INDICATION/HISTORY: SOB  -Additional: None     COMPARISON: 1/1/2019     TECHNIQUE: Portable frontal view of the chest     _______________     FINDINGS:     SUPPORT DEVICES: None.     HEART AND MEDIASTINUM: No appreciable cardiomegaly. Remaining mediastinal  contours within normal limits.     LUNGS AND PLEURAL SPACES: Pleural thickening is seen on the right. There is a  small right pleural effusion about the same. Atelectasis right lower lobe  improved. There is atelectasis in the left lung base which is worse than prior. No pneumothorax. Surgical clips project over the right chest.     BONY THORAX AND SOFT TISSUES: Vertebroplasty mid thoracic spine unchanged. Surgical sutures left chest appears new from prior.     _______________     IMPRESSION  IMPRESSION:        1. Atelectasis/scarring left lower lobe appears worse than prior. Patient  apparently has had left lung surgery. 2. Right pleural effusion appears small. Atelectasis right lower lobe improved.

## 2019-07-05 NOTE — PHYSICIAN ADVISORY
Letter of admission status determination Naye Diaz Age: 69 y.o. MRN: [id number] [facility]:  [id number] Date of admission: 7/4/2019 I have reviewed this case as it involves a Medicare patient admitted as Inpatient that does not meet the medical necessity requirements to support Inpatient status. Therefore, unless medically necessary hospital care for a second midnight is anticipated, I recommend downgrade to Outpatient OBSERVATION. This may change due to the medical condition of the patient and new clinical evidence as the patients care progreses. The final decision regarding the patient's hospitalization status depends on the attending physician's judgment. Allison Garcia MD, INDERJIT, [facility], [facility], CHCQM-PHYADV Physician Advisor [address] [place] [address],[address] [phone] 
 
 
-------------- 
UPDATE 7/8/19: On day 2, this frail high risk patient continued to require medically necessary hospital care, as he had diarrhea (C diff returned indeterminate positive), concern for respirootry infection, required continued IV antibiotics, further evaluation, and careful monitoring. As the patient already required medically necessary hospital services spanning two midnights, Inpatient status is appropriate.

## 2019-07-05 NOTE — PROGRESS NOTES
Problem: Mobility Impaired (Adult and Pediatric)  Goal: *Acute Goals and Plan of Care (Insert Text)  Description  Physical Therapy Goals   Initiated 7/5/2019 and to be accomplished within 5 day(s)  1. Patient will move from supine <> sit with mod I in prep for out of bed activity and change of position. 2.  Patient will perform sit<> stand with CGA/RW in prep for transfers/ambulation. 3.  Patient will transfer from bed <> chair with CGA/RW for time up in chair for completion of ADL activity. 4.  Patient will ambulate 100 feet with CGA/RW for improved functional mobility/safe discharge. Outcome: Progressing Towards Goal    PHYSICAL THERAPY EVALUATION    Patient: Pamela Clay (23 y.o. male)  Date: 7/5/2019  Primary Diagnosis: COPD exacerbation (HCC) [J44.1]  Pleural effusion [J90]  Weakness [R53.1]  Precautions:Fall, Aspiration    ASSESSMENT :  Based on the objective data described below, the patient is a 68 yo M seen on medical unit. Pt presents with weakness UE's/LE's, and limitations in overall functional mobility independence. Pt reports amb short distances with RW recently at [facility] where pt was receiving rehab after L lung surgery and discharged on July 3rd. Pt found seated EOB with SCD's on and linen wrapped around LE's. Pt required min/mod assist for sit<>stand and able to take several small steps laterally along side of bed with RW/min assist.  Julia slow with decreased foot clearance. Pt returned to bed with min/CGA and left with SCD's and all needs in place. Nurse Iliana Nelson notified of same. Recommend SNF upon discharge for continued physical therapy. Patient will benefit from skilled intervention to address the above impairments. Patient?s rehabilitation potential is considered to be Fair  Factors which may influence rehabilitation potential include:   ? None noted  ? Mental ability/status  ? Medical condition  ?          Home/family situation and support systems  ? Safety awareness  ? Pain tolerance/management  ? Other:      PLAN :  Recommendations and Planned Interventions:  ?           Bed Mobility Training             ? Neuromuscular Re-Education  ? Transfer Training                   ? Orthotic/Prosthetic Training  ? Gait Training                          ? Modalities  ? Therapeutic Exercises          ? Edema Management/Control  ? Therapeutic Activities            ? Patient and Family Training/Education  ? Other (comment):    Frequency/Duration: Patient will be followed by physical therapy 1-2 times per day to address goals. Discharge Recommendations: [facility]  Further Equipment Recommendations for Discharge: N/A     SUBJECTIVE:   Patient stated ? I can't get my legs back in bed.?    OBJECTIVE DATA SUMMARY:     Past Medical History:   Diagnosis Date    A-fib ([facility] [address]     CAD (coronary artery disease)     Diabetes ([facility] [address]     Pleural effusion      Past Surgical History:   Procedure Laterality Date    CARDIAC SURG PROCEDURE UNLIST      double bypass     Barriers to Learning/Limitations: yes;  physical  Compensate with: Visual Cues, Verbal Cues and Tactile Cues  Prior Level of Function/Home Situation: see above  Home Situation  Home Environment: Apartment  # Steps to Enter: 15  Rails to Enter: Yes  Hand Rails : Left  One/Two Story Residence: One story  # of Interior Steps: 0  Interior Rails: Left  Lift Chair Available: No  Living Alone: Yes  Support Systems: Yazidi / sergio community  Patient Expects to be Discharged to[de-identified] Skilled nursing facility  Current DME Used/Available at Home: Oxygen, portable, Wheelchair, Walker, rolling, Zehra Kiss, quad, Glucometer, [place], [employer] chair, Raised toilet seat, Walker  Tub or Shower Type: Tub/Shower combination  Critical Behavior:  Neurologic State: Alert; Appropriate for age  Orientation Level: Oriented X4  Cognition: Follows commands  Safety/Judgement: Awareness of environment  Psychosocial  Patient Behaviors: Calm; Cooperative  Purposeful Interaction: Yes  Pt Identified Daily Priority: Clinical issues (comment)  Caritas Process: Nurture loving kindness;Establish trust;Create healing environment  Caring Interventions: Reassure  Reassure: Therapeutic listening; Informing;Caring rounds  Skin Condition/Temp: Dry;Fragile; Warm  Skin Integrity: Scars (comment)(upper back)  Skin Integumentary  Skin Color: Red(sacrum)  Skin Condition/Temp: Dry;Fragile; Warm  Skin Integrity: Scars (comment)(upper back)  Turgor: Epidermis thin w/ loss of subcut tissue  Hair Growth: Sparce  Varicosities: Absent  Strength:    Strength: Generally decreased, functional  Tone & Sensation:   Tone: Normal  Sensation: Intact  Range Of Motion:  AROM: Within functional limits  Functional Mobility:  Bed Mobility:  Sit to Supine: Minimum assistance;Contact guard assistance  Transfers:  Sit to Stand: Minimum assistance/moderate assistance  Stand to Sit: Minimum assistance;Contact guard assistance  Balance:   Sitting: Intact  Standing: With support  Ambulation/Gait Training:  Distance (ft): 2 Feet (ft)  Assistive Device: Walker, rolling  Ambulation - Level of Assistance: Minimal assistance  Gait Description (WDL): Exceptions to WDL  Gait Abnormalities: Decreased step clearance; Step to gait; Shuffling gait  Interventions: Safety awareness training  Pain:  Pain Scale 1: Numeric (0 - 10)  Pain Intensity 1: 9  Pain Location 1: Abdomen  Pain Orientation 1: Upper  Pain Intervention(s) 1: Other (comment)(has had pain med per pt)  Activity Tolerance:   Fair   Please refer to the flowsheet for vital signs taken during this treatment. After treatment:   ?         Patient left in no apparent distress sitting up in chair  ? Patient left in no apparent distress in bed  ? Call bell left within reach  ? Nursing notified  ? Caregiver present  ?          Bed alarm activated    COMMUNICATION/EDUCATION:   ?         Fall prevention education was provided and the patient/caregiver indicated understanding. ? Patient/family have participated as able in goal setting and plan of care. ?         Patient/family agree to work toward stated goals and plan of care. ?         Patient understands intent and goals of therapy, but is neutral about his/her participation. ? Patient is unable to participate in goal setting and plan of care.     Thank you for this referral.  Elizabeth Lisa, PT   Time Calculation: 16 mins

## 2019-07-06 PROBLEM — J96.10 CHRONIC RESPIRATORY FAILURE (HCC): Status: ACTIVE | Noted: 2019-07-06

## 2019-07-06 PROBLEM — D72.829 LEUKOCYTOSIS: Status: ACTIVE | Noted: 2019-07-06

## 2019-07-06 LAB
ANION GAP SERPL CALC-SCNC: 2 MMOL/L (ref 3–18)
APPEARANCE UR: CLEAR
BILIRUB UR QL: NEGATIVE
BUN SERPL-MCNC: 15 MG/DL (ref 7–18)
BUN/CREAT SERPL: 33 (ref 12–20)
CALCIUM SERPL-MCNC: 9 MG/DL (ref 8.5–10.1)
CHLORIDE SERPL-SCNC: 102 MMOL/L (ref 100–108)
CO2 SERPL-SCNC: 38 MMOL/L (ref 21–32)
COLOR UR: ABNORMAL
CREAT SERPL-MCNC: 0.46 MG/DL (ref 0.6–1.3)
ERYTHROCYTE [DISTWIDTH] IN BLOOD BY AUTOMATED COUNT: 19.8 % (ref 11.6–14.5)
GLUCOSE BLD STRIP.AUTO-MCNC: 101 MG/DL (ref 70–110)
GLUCOSE BLD STRIP.AUTO-MCNC: 218 MG/DL (ref 70–110)
GLUCOSE BLD STRIP.AUTO-MCNC: 230 MG/DL (ref 70–110)
GLUCOSE BLD STRIP.AUTO-MCNC: 76 MG/DL (ref 70–110)
GLUCOSE SERPL-MCNC: 56 MG/DL (ref 74–99)
GLUCOSE UR STRIP.AUTO-MCNC: >1000 MG/DL
HCT VFR BLD AUTO: 30.1 % (ref 36–48)
HGB BLD-MCNC: 9.1 G/DL (ref 13–16)
HGB UR QL STRIP: NEGATIVE
INR PPP: 1.1 (ref 0.8–1.2)
KETONES UR QL STRIP.AUTO: NEGATIVE MG/DL
LEUKOCYTE ESTERASE UR QL STRIP.AUTO: NEGATIVE
MCH RBC QN AUTO: 31.4 PG (ref 24–34)
MCHC RBC AUTO-ENTMCNC: 30.2 G/DL (ref 31–37)
MCV RBC AUTO: 103.8 FL (ref 74–97)
NITRITE UR QL STRIP.AUTO: NEGATIVE
PH UR STRIP: 5.5 [PH] (ref 5–8)
PLATELET # BLD AUTO: 391 K/UL (ref 135–420)
PMV BLD AUTO: 10.2 FL (ref 9.2–11.8)
POTASSIUM SERPL-SCNC: 4.1 MMOL/L (ref 3.5–5.5)
PROT UR STRIP-MCNC: NEGATIVE MG/DL
PROTHROMBIN TIME: 14 SEC (ref 11.5–15.2)
RBC # BLD AUTO: 2.9 M/UL (ref 4.7–5.5)
SODIUM SERPL-SCNC: 142 MMOL/L (ref 136–145)
SP GR UR REFRACTOMETRY: 1.02 (ref 1–1.03)
UROBILINOGEN UR QL STRIP.AUTO: 1 EU/DL (ref 0.2–1)
WBC # BLD AUTO: 15.6 K/UL (ref 4.6–13.2)

## 2019-07-06 PROCEDURE — 81003 URINALYSIS AUTO W/O SCOPE: CPT

## 2019-07-06 PROCEDURE — 99218 HC RM OBSERVATION: CPT

## 2019-07-06 PROCEDURE — 74011000250 HC RX REV CODE- 250: Performed by: FAMILY MEDICINE

## 2019-07-06 PROCEDURE — 74011250637 HC RX REV CODE- 250/637: Performed by: FAMILY MEDICINE

## 2019-07-06 PROCEDURE — 74011636637 HC RX REV CODE- 636/637: Performed by: FAMILY MEDICINE

## 2019-07-06 PROCEDURE — 96365 THER/PROPH/DIAG IV INF INIT: CPT

## 2019-07-06 PROCEDURE — 74011000250 HC RX REV CODE- 250: Performed by: HOSPITALIST

## 2019-07-06 PROCEDURE — 74011250636 HC RX REV CODE- 250/636: Performed by: HOSPITALIST

## 2019-07-06 PROCEDURE — 85027 COMPLETE CBC AUTOMATED: CPT

## 2019-07-06 PROCEDURE — 74011250636 HC RX REV CODE- 250/636: Performed by: FAMILY MEDICINE

## 2019-07-06 PROCEDURE — 97116 GAIT TRAINING THERAPY: CPT

## 2019-07-06 PROCEDURE — 96375 TX/PRO/DX INJ NEW DRUG ADDON: CPT

## 2019-07-06 PROCEDURE — 82962 GLUCOSE BLOOD TEST: CPT

## 2019-07-06 PROCEDURE — 77010033678 HC OXYGEN DAILY

## 2019-07-06 PROCEDURE — 97530 THERAPEUTIC ACTIVITIES: CPT

## 2019-07-06 PROCEDURE — 80048 BASIC METABOLIC PNL TOTAL CA: CPT

## 2019-07-06 PROCEDURE — 74011636637 HC RX REV CODE- 636/637: Performed by: HOSPITALIST

## 2019-07-06 PROCEDURE — 85610 PROTHROMBIN TIME: CPT

## 2019-07-06 PROCEDURE — 96361 HYDRATE IV INFUSION ADD-ON: CPT

## 2019-07-06 PROCEDURE — 36415 COLL VENOUS BLD VENIPUNCTURE: CPT

## 2019-07-06 RX ORDER — INSULIN GLARGINE 100 [IU]/ML
10 INJECTION, SOLUTION SUBCUTANEOUS DAILY
Status: DISCONTINUED | OUTPATIENT
Start: 2019-07-06 | End: 2019-07-10 | Stop reason: HOSPADM

## 2019-07-06 RX ORDER — WARFARIN 1 MG/1
2 TABLET ORAL ONCE
Status: COMPLETED | OUTPATIENT
Start: 2019-07-06 | End: 2019-07-06

## 2019-07-06 RX ORDER — INSULIN LISPRO 100 [IU]/ML
7 INJECTION, SOLUTION INTRAVENOUS; SUBCUTANEOUS
Status: DISCONTINUED | OUTPATIENT
Start: 2019-07-06 | End: 2019-07-07

## 2019-07-06 RX ADMIN — ACETAMINOPHEN 650 MG: 325 TABLET ORAL at 18:14

## 2019-07-06 RX ADMIN — INSULIN LISPRO 7 UNITS: 100 INJECTION, SOLUTION INTRAVENOUS; SUBCUTANEOUS at 09:28

## 2019-07-06 RX ADMIN — OXYCODONE HYDROCHLORIDE 5 MG: 5 TABLET ORAL at 18:08

## 2019-07-06 RX ADMIN — INSULIN GLARGINE 10 UNITS: 100 INJECTION, SOLUTION SUBCUTANEOUS at 09:40

## 2019-07-06 RX ADMIN — ACETAMINOPHEN 650 MG: 325 TABLET ORAL at 08:10

## 2019-07-06 RX ADMIN — MULTIPLE VITAMINS W/ MINERALS TAB 1 TABLET: TAB at 09:29

## 2019-07-06 RX ADMIN — INSULIN LISPRO 4 UNITS: 100 INJECTION, SOLUTION INTRAVENOUS; SUBCUTANEOUS at 09:35

## 2019-07-06 RX ADMIN — SODIUM CHLORIDE 500 MG: 900 INJECTION, SOLUTION INTRAVENOUS at 13:16

## 2019-07-06 RX ADMIN — SODIUM CHLORIDE 100 ML/HR: 900 INJECTION, SOLUTION INTRAVENOUS at 09:41

## 2019-07-06 RX ADMIN — DONEPEZIL HYDROCHLORIDE 10 MG: 5 TABLET, FILM COATED ORAL at 21:04

## 2019-07-06 RX ADMIN — TAMSULOSIN HYDROCHLORIDE 0.4 MG: 0.4 CAPSULE ORAL at 09:29

## 2019-07-06 RX ADMIN — INSULIN LISPRO 4 UNITS: 100 INJECTION, SOLUTION INTRAVENOUS; SUBCUTANEOUS at 12:12

## 2019-07-06 RX ADMIN — FERROUS SULFATE TAB 325 MG (65 MG ELEMENTAL FE) 325 MG: 325 (65 FE) TAB at 09:29

## 2019-07-06 RX ADMIN — CEFTRIAXONE 1 G: 1 INJECTION, POWDER, FOR SOLUTION INTRAMUSCULAR; INTRAVENOUS at 12:11

## 2019-07-06 RX ADMIN — ATORVASTATIN CALCIUM 40 MG: 20 TABLET, FILM COATED ORAL at 09:29

## 2019-07-06 RX ADMIN — ALBUTEROL SULFATE 2 PUFF: 90 AEROSOL, METERED RESPIRATORY (INHALATION) at 09:34

## 2019-07-06 RX ADMIN — FLUTICASONE FUROATE AND VILANTEROL TRIFENATATE 1 PUFF: 200; 25 POWDER RESPIRATORY (INHALATION) at 09:34

## 2019-07-06 RX ADMIN — LOSARTAN POTASSIUM 25 MG: 25 TABLET ORAL at 09:29

## 2019-07-06 RX ADMIN — INSULIN LISPRO 7 UNITS: 100 INJECTION, SOLUTION INTRAVENOUS; SUBCUTANEOUS at 18:08

## 2019-07-06 RX ADMIN — ONDANSETRON 4 MG: 2 INJECTION INTRAMUSCULAR; INTRAVENOUS at 15:13

## 2019-07-06 RX ADMIN — DULOXETINE HYDROCHLORIDE 60 MG: 60 CAPSULE, DELAYED RELEASE ORAL at 09:29

## 2019-07-06 RX ADMIN — Medication 2 SPRAY: at 13:52

## 2019-07-06 RX ADMIN — OMEPRAZOLE 20 MG: 20 CAPSULE, DELAYED RELEASE ORAL at 09:29

## 2019-07-06 RX ADMIN — OXYCODONE HYDROCHLORIDE 5 MG: 5 TABLET ORAL at 08:03

## 2019-07-06 RX ADMIN — WARFARIN SODIUM 2 MG: 1 TABLET ORAL at 18:14

## 2019-07-06 RX ADMIN — INSULIN LISPRO 7 UNITS: 100 INJECTION, SOLUTION INTRAVENOUS; SUBCUTANEOUS at 12:12

## 2019-07-06 RX ADMIN — BUPROPION HYDROCHLORIDE 300 MG: 150 TABLET, EXTENDED RELEASE ORAL at 07:00

## 2019-07-06 NOTE — PROGRESS NOTES
Problem: Falls - Risk of  Goal: *Absence of Falls  Description  Document Sonia Pittman Fall Risk and appropriate interventions in the flowsheet.   Outcome: Progressing Towards Goal

## 2019-07-06 NOTE — PROGRESS NOTES
Nutrition Brief:    Taste tested supplements with patient and he liked the ensure clear apple. Supplement entered for patient. Noted significant wt loss of approx 13% body weight x 1 month. Pt with temporal wasting and clavicle protruding. Noted loss of muscle mass around tricep as well. Adult Malnutrition Criteria:     Patient is a 69 y.o. male, admitted on 070419 with a diagnosis of Weakness. Nutrition assessment was completed by RDN and the patient was found to meet the following malnutrition criteria established by ASPEN/AND:    Adult Malnutrition Guidelines:  SEVERE PROTEIN CALORIE MALNUTRITION IN THE CONTEXT OF ACUTE INJURY/ILLNESS  Weight loss: >2% x 1 week or >5% x 1 month or >7.5% x 3 months  Energy Intake: <50% energy intake compared to estimated energy needs >5 days  Body Fat: Moderate depletion  Muscle Mass:  Moderate depletion        Maddison Bonilla  07/06/19

## 2019-07-06 NOTE — PROGRESS NOTES
Pharmacy Dosing Services: Warfarin     Consult for Warfarin Dosing by Pharmacy by Dr. Aracely Espitia provided for this 69 y.o.  male , for indication of Atrial Fibrillation. Day of Therapy (continuation from home regimen )   Dose to achieve an INR goal of 2-3    Order entered for  Warfarin 2 mg to be given today at 18:00. Significant drug interactions: Azithromycin   PT/INR Lab Results   Component Value Date/Time    INR 1.1 07/06/2019 02:48 AM        Platelets Lab Results   Component Value Date/Time    PLATELET 479 33/43/7844 02:48 AM        H/H     Lab Results   Component Value Date/Time    HGB 9.1 (L) 07/06/2019 02:48 AM          Warfarin Administrations (last 168 hours)       Date/Time Action Medication Dose    07/05/19 1750 Given    warfarin (COUMADIN) tablet 1.5 mg 1.5 mg    07/05/19 0259 Given    warfarin (COUMADIN) tablet 1.5 mg 1.5 mg          Pharmacy to follow daily and will provide subsequent Warfarin dosing based on clinical status.   Kadie Gonzalez [facility] [place])  Contact information [phone]

## 2019-07-06 NOTE — PROGRESS NOTES
Problem: Mobility Impaired (Adult and Pediatric)  Goal: *Acute Goals and Plan of Care (Insert Text)  Description  Physical Therapy Goals   Initiated 7/5/2019 and to be accomplished within 5 day(s)  1. Patient will move from supine <> sit with mod I in prep for out of bed activity and change of position. 2.  Patient will perform sit<> stand with CGA/RW in prep for transfers/ambulation. 3.  Patient will transfer from bed <> chair with CGA/RW for time up in chair for completion of ADL activity. 4.  Patient will ambulate 100 feet with CGA/RW for improved functional mobility/safe discharge. Outcome: Progressing Towards Goal   PHYSICAL THERAPY TREATMENT    Patient: Shannon Guzman (76 y.o. male)  Date: 7/6/2019  Diagnosis: COPD exacerbation (HCC) [J44.1]  Pleural effusion [J90]  Weakness [R53.1] Weakness       Precautions: Fall, Aspiration   Chart, physical therapy assessment, plan of care and goals were reviewed. ASSESSMENT:  Pt supine in bed, willing to participate, iso precautions no longer on door. Pt demo'd improved tolerance to PT today but still req increased time to complete tasks. Sp02 and HR 95% and 67bpm after session. Pt able to neg RW with turns and tight spaces in room. Pt returned to bed, EOB, tray in front, call bell within reach, NAD, eating lunch. Progression toward goals:  ?      Improving appropriately and progressing toward goals  ? Improving slowly and progressing toward goals  ? Not making progress toward goals and plan of care will be adjusted     PLAN:  Patient continues to benefit from skilled intervention to address the above impairments. Continue treatment per established plan of care. Discharge Recommendations:  [facility]  Further Equipment Recommendations for Discharge:  N/A     SUBJECTIVE:   Patient stated ? I can't go home like this. ?    OBJECTIVE DATA SUMMARY:   Critical Behavior:  Neurologic State: Alert, Eyes open spontaneously  Orientation Level: Oriented X4  Cognition: Appropriate decision making, Appropriate for age attention/concentration, Appropriate safety awareness, Follows commands  Safety/Judgement: Awareness of environment  Functional Mobility Training:  Bed Mobility:   Supine to Sit: Contact guard assistance  Sit to Supine: Contact guard assistance  Scooting: Contact guard assistance  Transfers:  Sit to Stand: Contact guard assistance;Minimum assistance  Stand to Sit: Stand-by assistance  Balance:  Sitting: Intact  Standing: Intact; With support  Ambulation/Gait Training:  Distance (ft): 25 Feet (ft)  Assistive Device: Walker, rolling;Gait belt  Ambulation - Level of Assistance: Minimal assistance;Contact guard assistance  Gait Abnormalities: Decreased step clearance; Step to gait  Base of Support: Narrowed   Speed/Julia: Slow;Shuffled  Step Length: Right shortened;Left shortened  Interventions: Safety awareness training  Pain:  Pain Scale 1: Numeric (0 - 10)  Pain Intensity 1: 9  Pain Location 1: Chest;Abdomen  Pain Orientation 1: Upper  Pain Description 1: Aching  Pain Intervention(s) 1: Medication (see MAR)  Activity Tolerance:   good  Please refer to the flowsheet for vital signs taken during this treatment. After treatment:   ? Patient left in no apparent distress sitting up in chair  ? Patient left in no apparent distress in bed  ? Call bell left within reach  ? Nursing notified  ? Caregiver present  ?  Bed alarm activated      Saira Hubbard   Time Calculation: 23 mins

## 2019-07-06 NOTE — PROGRESS NOTES
Hospitalist Progress Note    Patient: Kala Lozoya MRN: [id number]  CSN: [id number]    YOB: 1945  Age: 69 y.o. Sex: male    DOA: 7/4/2019 LOS:  LOS: 1 day          Chief Complaint:      weakness    Assessment/Plan     68 yo debilitated WM recent hospital stay at [facility] for hydropneumothorax then extended rehab stay was discharged to apartment where he made it only a few days then had to call EMS to get him as he was not able to care for himself -diarrhea, malnourished, chronic lung disease, increased wbc count      1. Weakness-PT recommendations  2. Chronic home oxygen use-advanced lung disease-see below, continue 02  3. Atrial fibrillation, chronic persistent, resumed warfarin, follow INR  4. Coronary artery disease-no chest pain  5. NIDDM2 WITH HYPERGLYCEMIA-ADD LANTUS AND PREMEAL LISPRO tid  6.  History of dysphagia-MBS, speech therapy  7.leukocytosis-treat empirically for poss pneumonia and send UA also  8.diarrhea-c diff neg  9.moderate prot panchito malnutrition-nutrition consulting-advanced weight loss in last few months      He may need SNF as no support at home  Continue med managem,ent  Repeat CBC in am with diff  Continue abx for now  Disposition :  Patient Active Problem List   Diagnosis Code    Pleural effusion J90    COPD exacerbation ([facility]) J44.1    Weakness R53.1    A-fib ([facility]) I48.91    Diabetes ([facility] [address] E11.9    CAD (coronary artery disease) I25.10    Leukocytosis D72.829    Chronic respiratory failure ([facility]) J96.10       Subjective:    He feels ok  Denies SOB or cough this am  No chest pain  Denies dysuria    Review of systems:    Constitutional: denies fevers, chills, myalgias  Respiratory: denies SOB, cough  Cardiovascular: denies chest pain, palpitations  Gastrointestinal:  diarrhea      Vital signs/Intake and Output:  Visit Vitals  /72   Pulse 64   Temp 97.8 °F (36.6 °C)   Resp 16   Ht 5' 5\" (1.651 m)   Wt 51.1 kg (112 lb 11.2 oz)   SpO2 92%   BMI 18.75 kg/m² Current Shift:  07/06 0701 - 07/06 1900  In: 131 [P.O.:240; I.V.:453]  Out: 100 [Urine:100]  Last three shifts:  07/04 1901 - 07/06 0700  In: 3195 [P.O.:480; I.V.:2715]  Out: 600 [Urine:600]    Exam:    General: elderly debilitated WM, alert, NAD, OX3  Head/Neck: NCAT, supple, No masses, No lymphadenopathy  CVS:irreg rhythm, reg rate, no M/R/G, S1/S2 heard, no thrill  Lungs:Clear to auscultation bilaterally, no wheezes, rhonchi, or rales  Abdomen: Soft, Nontender, No distention, Normal Bowel sounds, No hepatomegaly  Extremities: No C/C/E, pulses palpable 2+  Neuro:grossly normal , follows commands  Psych:appropriate                Labs: Results:       Chemistry Recent Labs     07/06/19 0248 07/04/19 2036   GLU 56* 233*    138   K 4.1 4.1    92*   CO2 38* 40*   BUN 15 15   CREA 0.46* 0.59*   CA 9.0 9.8   AGAP 2* 6   BUCR 33* 25*   AP  --  139*   TP  --  7.0   ALB  --  2.9*   GLOB  --  4.1*   AGRAT  --  0.7*      CBC w/Diff Recent Labs     07/06/19 0248 07/04/19 2036   WBC 15.6* 12.5   RBC 2.90* 3.37*   HGB 9.1* 10.4*   HCT 30.1* 34.0*    434*   GRANS  --  84*   LYMPH  --  11*   EOS  --  0      Cardiac Enzymes Recent Labs     07/05/19  1346 07/05/19  0806   CPK 31* 30*   CKND1 8.1* 7.7*      Coagulation Recent Labs     07/06/19 0248 07/05/19  0210   PTP 14.0 13.9   INR 1.1 1.1       Lipid Panel No results found for: CHOL, CHOLPOCT, CHOLX, CHLST, CHOLV, 844831, HDL, LDL, LDLC, DLDLP, 538974, VLDLC, VLDL, TGLX, TRIGL, TRIGP, TGLPOCT, CHHD, CHHDX   BNP No results for input(s): BNPP in the last 72 hours.    Liver Enzymes Recent Labs     07/04/19 2036   TP 7.0   ALB 2.9*   *   SGOT 22      Thyroid Studies No results found for: T4, T3U, TSH, TSHEXT, TSHEXT     Procedures/imaging: see electronic medical records for all procedures/Xrays and details which were not copied into this note but were reviewed prior to creation of Lucy Castaneda MD

## 2019-07-06 NOTE — PROGRESS NOTES
1513 Pt vomited. C/o nausea. Gave zofran IV 4mg as ordered. 1700 Reassessed pt. Pt stated that he felt relief of nausea. BG 76. Encouraged to drink soda. SHIFT SUMMARY: Urine was sent to lab for UA. Pt able to tolerate dysphagia diet with thin liquids by turning head to left side. Pain is located generalized over chest, and lidocaine patch was placed on left lower rib cage. Bedside and Verbal shift change report given to Sha Jimenez RN (oncoming nurse) by Tomi Mendiola RN  (offgoing nurse). Report included the following information SBAR, Kardex, Intake/Output and MAR.

## 2019-07-07 LAB
ANION GAP SERPL CALC-SCNC: 2 MMOL/L (ref 3–18)
BASOPHILS # BLD: 0 K/UL (ref 0–0.1)
BASOPHILS NFR BLD: 0 % (ref 0–3)
BUN SERPL-MCNC: 10 MG/DL (ref 7–18)
BUN/CREAT SERPL: 26 (ref 12–20)
CALCIUM SERPL-MCNC: 9.1 MG/DL (ref 8.5–10.1)
CHLORIDE SERPL-SCNC: 98 MMOL/L (ref 100–108)
CO2 SERPL-SCNC: 41 MMOL/L (ref 21–32)
CREAT SERPL-MCNC: 0.39 MG/DL (ref 0.6–1.3)
DIFFERENTIAL METHOD BLD: ABNORMAL
EOSINOPHIL # BLD: 0.2 K/UL (ref 0–0.4)
EOSINOPHIL NFR BLD: 2 % (ref 0–5)
ERYTHROCYTE [DISTWIDTH] IN BLOOD BY AUTOMATED COUNT: 19.9 % (ref 11.6–14.5)
GLUCOSE BLD STRIP.AUTO-MCNC: 119 MG/DL (ref 70–110)
GLUCOSE BLD STRIP.AUTO-MCNC: 163 MG/DL (ref 70–110)
GLUCOSE BLD STRIP.AUTO-MCNC: 271 MG/DL (ref 70–110)
GLUCOSE BLD STRIP.AUTO-MCNC: 95 MG/DL (ref 70–110)
GLUCOSE SERPL-MCNC: 86 MG/DL (ref 74–99)
HCT VFR BLD AUTO: 30.1 % (ref 36–48)
HGB BLD-MCNC: 8.9 G/DL (ref 13–16)
INR PPP: 1.3 (ref 0.8–1.2)
LYMPHOCYTES # BLD: 1 K/UL (ref 0.8–3.5)
LYMPHOCYTES NFR BLD: 11 % (ref 20–51)
MCH RBC QN AUTO: 30.8 PG (ref 24–34)
MCHC RBC AUTO-ENTMCNC: 29.6 G/DL (ref 31–37)
MCV RBC AUTO: 104.2 FL (ref 74–97)
MONOCYTES # BLD: 1 K/UL (ref 0–1)
MONOCYTES NFR BLD: 11 % (ref 2–9)
NEUTS BAND NFR BLD MANUAL: 1 % (ref 0–5)
NEUTS SEG # BLD: 6.8 K/UL (ref 1.8–8)
NEUTS SEG NFR BLD: 75 % (ref 42–75)
PLATELET # BLD AUTO: 362 K/UL (ref 135–420)
PLATELET COMMENTS,PCOM: ABNORMAL
PMV BLD AUTO: 9.9 FL (ref 9.2–11.8)
POTASSIUM SERPL-SCNC: 3.8 MMOL/L (ref 3.5–5.5)
PROTHROMBIN TIME: 15.5 SEC (ref 11.5–15.2)
RBC # BLD AUTO: 2.89 M/UL (ref 4.7–5.5)
RBC MORPH BLD: ABNORMAL
RBC MORPH BLD: ABNORMAL
SODIUM SERPL-SCNC: 141 MMOL/L (ref 136–145)
WBC # BLD AUTO: 9.1 K/UL (ref 4.6–13.2)

## 2019-07-07 PROCEDURE — 94760 N-INVAS EAR/PLS OXIMETRY 1: CPT

## 2019-07-07 PROCEDURE — 80048 BASIC METABOLIC PNL TOTAL CA: CPT

## 2019-07-07 PROCEDURE — 96376 TX/PRO/DX INJ SAME DRUG ADON: CPT

## 2019-07-07 PROCEDURE — 97116 GAIT TRAINING THERAPY: CPT

## 2019-07-07 PROCEDURE — 74011636637 HC RX REV CODE- 636/637: Performed by: HOSPITALIST

## 2019-07-07 PROCEDURE — 85610 PROTHROMBIN TIME: CPT

## 2019-07-07 PROCEDURE — 36415 COLL VENOUS BLD VENIPUNCTURE: CPT

## 2019-07-07 PROCEDURE — 82962 GLUCOSE BLOOD TEST: CPT

## 2019-07-07 PROCEDURE — 74011250637 HC RX REV CODE- 250/637: Performed by: HOSPITALIST

## 2019-07-07 PROCEDURE — 74011000250 HC RX REV CODE- 250: Performed by: FAMILY MEDICINE

## 2019-07-07 PROCEDURE — 96365 THER/PROPH/DIAG IV INF INIT: CPT

## 2019-07-07 PROCEDURE — 94640 AIRWAY INHALATION TREATMENT: CPT

## 2019-07-07 PROCEDURE — 99218 HC RM OBSERVATION: CPT

## 2019-07-07 PROCEDURE — 74011636637 HC RX REV CODE- 636/637: Performed by: FAMILY MEDICINE

## 2019-07-07 PROCEDURE — 74011000250 HC RX REV CODE- 250: Performed by: HOSPITALIST

## 2019-07-07 PROCEDURE — 77010033678 HC OXYGEN DAILY

## 2019-07-07 PROCEDURE — 74011250636 HC RX REV CODE- 250/636: Performed by: HOSPITALIST

## 2019-07-07 PROCEDURE — 97530 THERAPEUTIC ACTIVITIES: CPT

## 2019-07-07 PROCEDURE — 74011250637 HC RX REV CODE- 250/637: Performed by: FAMILY MEDICINE

## 2019-07-07 PROCEDURE — 85025 COMPLETE CBC W/AUTO DIFF WBC: CPT

## 2019-07-07 RX ORDER — WARFARIN 1 MG/1
2.5 TABLET ORAL ONCE
Status: COMPLETED | OUTPATIENT
Start: 2019-07-07 | End: 2019-07-07

## 2019-07-07 RX ORDER — SAME BUTANEDISULFONATE/BETAINE 400-600 MG
250 POWDER IN PACKET (EA) ORAL 2 TIMES DAILY
Status: DISCONTINUED | OUTPATIENT
Start: 2019-07-07 | End: 2019-07-10 | Stop reason: HOSPADM

## 2019-07-07 RX ORDER — IPRATROPIUM BROMIDE AND ALBUTEROL SULFATE 2.5; .5 MG/3ML; MG/3ML
3 SOLUTION RESPIRATORY (INHALATION)
Status: DISCONTINUED | OUTPATIENT
Start: 2019-07-07 | End: 2019-07-10 | Stop reason: HOSPADM

## 2019-07-07 RX ADMIN — TAMSULOSIN HYDROCHLORIDE 0.4 MG: 0.4 CAPSULE ORAL at 08:47

## 2019-07-07 RX ADMIN — BUPROPION HYDROCHLORIDE 300 MG: 150 TABLET, EXTENDED RELEASE ORAL at 06:34

## 2019-07-07 RX ADMIN — IPRATROPIUM BROMIDE AND ALBUTEROL SULFATE 3 ML: .5; 3 SOLUTION RESPIRATORY (INHALATION) at 13:05

## 2019-07-07 RX ADMIN — OXYCODONE HYDROCHLORIDE 5 MG: 5 TABLET ORAL at 03:29

## 2019-07-07 RX ADMIN — INSULIN LISPRO 2 UNITS: 100 INJECTION, SOLUTION INTRAVENOUS; SUBCUTANEOUS at 12:11

## 2019-07-07 RX ADMIN — ACETAMINOPHEN 650 MG: 325 TABLET ORAL at 17:36

## 2019-07-07 RX ADMIN — FERROUS SULFATE TAB 325 MG (65 MG ELEMENTAL FE) 325 MG: 325 (65 FE) TAB at 08:47

## 2019-07-07 RX ADMIN — ACETAMINOPHEN 650 MG: 325 TABLET ORAL at 09:17

## 2019-07-07 RX ADMIN — SODIUM CHLORIDE 500 MG: 900 INJECTION, SOLUTION INTRAVENOUS at 13:55

## 2019-07-07 RX ADMIN — ACETAMINOPHEN 650 MG: 325 TABLET ORAL at 03:35

## 2019-07-07 RX ADMIN — LOSARTAN POTASSIUM 25 MG: 25 TABLET ORAL at 08:47

## 2019-07-07 RX ADMIN — FLUTICASONE FUROATE AND VILANTEROL TRIFENATATE 1 PUFF: 200; 25 POWDER RESPIRATORY (INHALATION) at 08:49

## 2019-07-07 RX ADMIN — OXYCODONE HYDROCHLORIDE 5 MG: 5 TABLET ORAL at 09:17

## 2019-07-07 RX ADMIN — OXYCODONE HYDROCHLORIDE 5 MG: 5 TABLET ORAL at 17:36

## 2019-07-07 RX ADMIN — Medication 250 MG: at 12:14

## 2019-07-07 RX ADMIN — MULTIPLE VITAMINS W/ MINERALS TAB 1 TABLET: TAB at 08:47

## 2019-07-07 RX ADMIN — INSULIN LISPRO 6 UNITS: 100 INJECTION, SOLUTION INTRAVENOUS; SUBCUTANEOUS at 17:35

## 2019-07-07 RX ADMIN — CEFTRIAXONE 1 G: 1 INJECTION, POWDER, FOR SOLUTION INTRAMUSCULAR; INTRAVENOUS at 12:10

## 2019-07-07 RX ADMIN — ALBUTEROL SULFATE 2 PUFF: 90 AEROSOL, METERED RESPIRATORY (INHALATION) at 11:00

## 2019-07-07 RX ADMIN — ATORVASTATIN CALCIUM 40 MG: 20 TABLET, FILM COATED ORAL at 08:47

## 2019-07-07 RX ADMIN — WARFARIN SODIUM 2.5 MG: 1 TABLET ORAL at 17:35

## 2019-07-07 RX ADMIN — INSULIN LISPRO 7 UNITS: 100 INJECTION, SOLUTION INTRAVENOUS; SUBCUTANEOUS at 08:45

## 2019-07-07 RX ADMIN — DULOXETINE HYDROCHLORIDE 60 MG: 60 CAPSULE, DELAYED RELEASE ORAL at 08:47

## 2019-07-07 RX ADMIN — INSULIN GLARGINE 10 UNITS: 100 INJECTION, SOLUTION SUBCUTANEOUS at 08:45

## 2019-07-07 RX ADMIN — OMEPRAZOLE 20 MG: 20 CAPSULE, DELAYED RELEASE ORAL at 08:47

## 2019-07-07 NOTE — PROGRESS NOTES
Pharmacy Dosing Services: Warfarin    Consult for Warfarin Dosing by Pharmacy by Dr. Adrián Juan provided for this 69 y.o.  male , for indication of Atrial Fibrillation. Continuation of home med  Dose to achieve an INR goal of 2-3    Order entered for Warfarin 2.5 mg to be given today at 1800. Significant drug interactions: Azithromycin  PT/INR Lab Results   Component Value Date/Time    INR 1.3 (H) 07/07/2019 02:32 AM        Platelets Lab Results   Component Value Date/Time    PLATELET 836 24/92/3406 02:32 AM        H/H     Lab Results   Component Value Date/Time    HGB 8.9 (L) 07/07/2019 02:32 AM          Warfarin Administrations (last 168 hours)       Date/Time Action Medication Dose    07/06/19 1814 Given    warfarin (COUMADIN) tablet 2 mg 2 mg    07/05/19 1750 Given    warfarin (COUMADIN) tablet 1.5 mg 1.5 mg    07/05/19 0259 Given    warfarin (COUMADIN) tablet 1.5 mg 1.5 mg            Pharmacy to follow daily and will provide subsequent Warfarin dosing based on clinical status.   TAJ Muse  Contact information: [phone]

## 2019-07-07 NOTE — PROGRESS NOTES
Shift Progress Note:  Assumed care of patient in bed asleep but easily aroused. No complaints offered. Medicated x1 for pain with relief noted. Uneventful night. VSS, call bell within reach.   Patient Vitals for the past 12 hrs:   Temp Pulse Resp BP SpO2   07/07/19 0705 97.4 °F (36.3 °C) 76 16 144/69 100 %   07/07/19 0334 97.5 °F (36.4 °C) 78 18 135/59 100 %   07/06/19 2300 97.6 °F (36.4 °C) 72 15 127/61 100 %

## 2019-07-07 NOTE — PROGRESS NOTES
Problem: Mobility Impaired (Adult and Pediatric)  Goal: *Acute Goals and Plan of Care (Insert Text)  Description  Physical Therapy Goals   Initiated 7/5/2019 and to be accomplished within 5 day(s)  1. Patient will move from supine <> sit with mod I in prep for out of bed activity and change of position. 2.  Patient will perform sit<> stand with CGA/RW in prep for transfers/ambulation. 3.  Patient will transfer from bed <> chair with CGA/RW for time up in chair for completion of ADL activity. 4.  Patient will ambulate 100 feet with CGA/RW for improved functional mobility/safe discharge. Outcome: Progressing Towards Goal   PHYSICAL THERAPY TREATMENT    Patient: Janelle Suárez (37 y.o. male)  Date: 7/7/2019  Diagnosis: COPD exacerbation (HCC) [J44.1]  Pleural effusion [J90]  Weakness [R53.1] Weakness       Precautions: Fall, Aspiration   Chart, physical therapy assessment, plan of care and goals were reviewed. ASSESSMENT:  Pt supine in bed, willing to participate. O2 via NC at 3L. See vitals below for tolerance of treatment. Pt able to tolerate increased distance and mobility today, but demo'd increased SOB. Pt improved step clearance after VC. Pt returned to sitting EOB, all needs met, call bell within reach. Progression toward goals:  ?      Improving appropriately and progressing toward goals  ? Improving slowly and progressing toward goals  ? Not making progress toward goals and plan of care will be adjusted     PLAN:  Patient continues to benefit from skilled intervention to address the above impairments. Continue treatment per established plan of care. Discharge Recommendations:  [facility]  Further Equipment Recommendations for Discharge:  N/A     SUBJECTIVE:   Patient stated ? I was sick yesterday, it was the ensure. ?    OBJECTIVE DATA SUMMARY:   Critical Behavior:  Neurologic State: Alert, Eyes open spontaneously  Orientation Level: Oriented X4  Cognition: Follows commands, Recognition of people/places  Safety/Judgement: Awareness of environment  Functional Mobility Training:  Bed Mobility:  Rolling: Contact guard assistance  Supine to Sit: Contact guard assistance  Sit to Supine: Contact guard assistance  Scooting: Contact guard assistance  Transfers:  Sit to Stand: Contact guard assistance  Stand to Sit: Contact guard assistance  Balance:  Sitting: Intact  Standing: Intact; With support  Ambulation/Gait Training:  Distance (ft): 35 Feet (ft)  Assistive Device: Walker, rolling;Gait belt  Ambulation - Level of Assistance: Contact guard assistance    Gait Abnormalities: Decreased step clearance  Base of Support: Narrowed   Speed/Julia: Slow  Step Length: Right shortened;Left shortened  Pain:  Pain Scale 1: Numeric (0 - 10)  Pain Intensity 1: 9  Pain Location 1: Chest  Pain Orientation 1: (generalized)  Pain Description 1: Constant  Pain Intervention(s) 1: Medication (see MAR)  Activity Tolerance:   good  Please refer to the flowsheet for vital signs taken during this treatment. After treatment:   ? Patient left in no apparent distress sitting up in chair  ? Patient left in no apparent distress in bed, sitting EOB  ? Call bell left within reach  ? Nursing notified  ? Caregiver present  ?  Bed alarm activated      Saira Hubbard   Time Calculation: 23 mins

## 2019-07-07 NOTE — ROUTINE PROCESS
Bedside and Verbal shift change report given to Laotnya CRUZ (oncoming nurse) by Phillip Vera RN 
(offgoing nurse). Report included the following information SBAR, Kardex, Intake/Output, MAR and Recent Results.

## 2019-07-07 NOTE — PROGRESS NOTES
Hospitalist Progress Note    Patient: Jeryl Siemens MRN: [id number]  CSN: [id number]    YOB: 1945  Age: 69 y.o. Sex: male    DOA: 7/4/2019 LOS:  LOS: 1 day          Chief Complaint:    weakness      Assessment/Plan     70 yo debilitated WM recent hospital stay at [facility] for hydropneumothorax then extended rehab stay was discharged to apartment where he made it only a few days then had to call EMS to get him as he was not able to care for himself -diarrhea, malnourished, chronic lung disease, increased wbc count        1. Weakness-PT recommendations  2. Chronic home oxygen use-advanced lung disease-see below, continue 02  3. Atrial fibrillation, chronic persistent, resumed warfarin, follow INR  4. Coronary artery disease-no chest pain  5. NIDDM2 WITH HYPERGLYCEMIA-BG trending down now, sos stop premeal insulin  6.  History of dysphagia-MBS, speech therapy  7.leukocytosis-treat empirically for poss pneumonia and send UA also  8.diarrhea-c diff neg-add probiotics  9.severe prot panchito malnutrition-nutrition consulting-advanced weight loss in last few months  10.recent surgery and extensive hospi stay for hydropneumothorax    Requires SNF for d/c  Will need plans made for safe d/c  Finish course of abx for poss resp infection            Disposition :  Patient Active Problem List   Diagnosis Code    Pleural effusion J90    COPD exacerbation ([facility] [address] J44.1    Weakness R53.1    A-fib ([facility] [address] I48.91    Diabetes ([facility] [address] E11.9    CAD (coronary artery disease) I25.10    Leukocytosis D72.829    Chronic respiratory failure (HCC) J96.10       Subjective:  Feels ok  Weak  Pain left side of chest  Eating what he can        Review of systems:    Constitutional: denies fevers, chills, myalgias  Respiratory: denies cough  Cardiovascular: denies  palpitations  Gastrointestinal: denies nausea, vomiting      Vital signs/Intake and Output:  Visit Vitals  /69   Pulse 76   Temp 97.4 °F (36.3 °C)   Resp 16   Ht 5' 5\" (1.651 m)   Wt 51.1 kg (112 lb 11.2 oz)   SpO2 100%   BMI 18.75 kg/m²     Current Shift:  No intake/output data recorded. Last three shifts:  07/05 1901 - 07/07 0700  In: 2586 [P.O.:480; I.V.:2106]  Out: 600 [Urine:600]    Exam:    General: thin debilitated elderly WM, alert, NAD, OX3  Head/Neck: NCAT, supple, No masses, No lymphadenopathy  CVS:Regular rate and rhythm, no M/R/G, S1/S2 heard, no thrill  Lungs:large scar left chest wall, dec BS left base  Abdomen: Soft, Nontender, No distention, Normal Bowel sounds, No hepatomegaly  Extremities: No C/C/E, pulses palpable 2+  Neuro:grossly normal , follows commands  Psych:appropriate                Labs: Results:       Chemistry Recent Labs     07/07/19 0232 07/06/19 0248 07/04/19 2036   GLU 86 56* 233*    142 138   K 3.8 4.1 4.1   CL 98* 102 92*   CO2 41* 38* 40*   BUN 10 15 15   CREA 0.39* 0.46* 0.59*   CA 9.1 9.0 9.8   AGAP 2* 2* 6   BUCR 26* 33* 25*   AP  --   --  139*   TP  --   --  7.0   ALB  --   --  2.9*   GLOB  --   --  4.1*   AGRAT  --   --  0.7*      CBC w/Diff Recent Labs     07/07/19 0232 07/06/19 0248 07/04/19 2036   WBC 9.1 15.6* 12.5   RBC 2.89* 2.90* 3.37*   HGB 8.9* 9.1* 10.4*   HCT 30.1* 30.1* 34.0*    391 434*   GRANS 75  --  84*   LYMPH 11*  --  11*   EOS 2  --  0      Cardiac Enzymes Recent Labs     07/05/19  1346 07/05/19  0806   CPK 31* 30*   CKND1 8.1* 7.7*      Coagulation Recent Labs     07/07/19 0232 07/06/19 0248   PTP 15.5* 14.0   INR 1.3* 1.1       Lipid Panel No results found for: CHOL, CHOLPOCT, CHOLX, CHLST, CHOLV, 057370, HDL, LDL, LDLC, DLDLP, 541532, VLDLC, VLDL, TGLX, TRIGL, TRIGP, TGLPOCT, CHHD, CHHDX   BNP No results for input(s): BNPP in the last 72 hours.    Liver Enzymes Recent Labs     07/04/19 2036   TP 7.0   ALB 2.9*   *   SGOT 22      Thyroid Studies No results found for: T4, T3U, TSH, TSHEXT     Procedures/imaging: see electronic medical records for all procedures/Xrays and details which were not copied into this note but were reviewed prior to creation of Bernardo Lopez MD

## 2019-07-07 NOTE — PROGRESS NOTES
Respiratory asked to check on patient by nurse for increased SOB. BS diminished and mainly clear. Very fine rales in RLL. Patient states that he takes nebulizer therapy at home regularly. RT Will suggest to MD nebulizer PRN. Encouraged IS use.

## 2019-07-08 LAB
ANION GAP SERPL CALC-SCNC: 2 MMOL/L (ref 3–18)
BASOPHILS # BLD: 0 K/UL (ref 0–0.1)
BASOPHILS NFR BLD: 0 % (ref 0–2)
BUN SERPL-MCNC: 9 MG/DL (ref 7–18)
BUN/CREAT SERPL: 19 (ref 12–20)
CALCIUM SERPL-MCNC: 9.2 MG/DL (ref 8.5–10.1)
CHLORIDE SERPL-SCNC: 97 MMOL/L (ref 100–108)
CO2 SERPL-SCNC: 41 MMOL/L (ref 21–32)
CREAT SERPL-MCNC: 0.48 MG/DL (ref 0.6–1.3)
DIFFERENTIAL METHOD BLD: ABNORMAL
EOSINOPHIL # BLD: 0.3 K/UL (ref 0–0.4)
EOSINOPHIL NFR BLD: 3 % (ref 0–5)
ERYTHROCYTE [DISTWIDTH] IN BLOOD BY AUTOMATED COUNT: 19.7 % (ref 11.6–14.5)
GLUCOSE BLD STRIP.AUTO-MCNC: 112 MG/DL (ref 70–110)
GLUCOSE BLD STRIP.AUTO-MCNC: 137 MG/DL (ref 70–110)
GLUCOSE BLD STRIP.AUTO-MCNC: 145 MG/DL (ref 70–110)
GLUCOSE BLD STRIP.AUTO-MCNC: 170 MG/DL (ref 70–110)
GLUCOSE SERPL-MCNC: 90 MG/DL (ref 74–99)
HCT VFR BLD AUTO: 29.5 % (ref 36–48)
HGB BLD-MCNC: 8.8 G/DL (ref 13–16)
INR PPP: 1.7 (ref 0.8–1.2)
LYMPHOCYTES # BLD: 1.1 K/UL (ref 0.9–3.6)
LYMPHOCYTES NFR BLD: 10 % (ref 21–52)
MCH RBC QN AUTO: 30.8 PG (ref 24–34)
MCHC RBC AUTO-ENTMCNC: 29.8 G/DL (ref 31–37)
MCV RBC AUTO: 103.1 FL (ref 74–97)
MONOCYTES # BLD: 1.6 K/UL (ref 0.05–1.2)
MONOCYTES NFR BLD: 15 % (ref 3–10)
NEUTS SEG # BLD: 7.9 K/UL (ref 1.8–8)
NEUTS SEG NFR BLD: 72 % (ref 40–73)
PLATELET # BLD AUTO: 340 K/UL (ref 135–420)
PMV BLD AUTO: 9.8 FL (ref 9.2–11.8)
POTASSIUM SERPL-SCNC: 3.6 MMOL/L (ref 3.5–5.5)
PROTHROMBIN TIME: 19.5 SEC (ref 11.5–15.2)
RBC # BLD AUTO: 2.86 M/UL (ref 4.7–5.5)
SODIUM SERPL-SCNC: 140 MMOL/L (ref 136–145)
WBC # BLD AUTO: 11 K/UL (ref 4.6–13.2)

## 2019-07-08 PROCEDURE — 80048 BASIC METABOLIC PNL TOTAL CA: CPT

## 2019-07-08 PROCEDURE — 74011250636 HC RX REV CODE- 250/636: Performed by: HOSPITALIST

## 2019-07-08 PROCEDURE — 74011000250 HC RX REV CODE- 250: Performed by: HOSPITALIST

## 2019-07-08 PROCEDURE — 74011636637 HC RX REV CODE- 636/637: Performed by: HOSPITALIST

## 2019-07-08 PROCEDURE — 74011636637 HC RX REV CODE- 636/637: Performed by: FAMILY MEDICINE

## 2019-07-08 PROCEDURE — 99218 HC RM OBSERVATION: CPT

## 2019-07-08 PROCEDURE — 97116 GAIT TRAINING THERAPY: CPT

## 2019-07-08 PROCEDURE — 36415 COLL VENOUS BLD VENIPUNCTURE: CPT

## 2019-07-08 PROCEDURE — 82962 GLUCOSE BLOOD TEST: CPT

## 2019-07-08 PROCEDURE — 96376 TX/PRO/DX INJ SAME DRUG ADON: CPT

## 2019-07-08 PROCEDURE — 96366 THER/PROPH/DIAG IV INF ADDON: CPT

## 2019-07-08 PROCEDURE — 65270000029 HC RM PRIVATE

## 2019-07-08 PROCEDURE — 74011250637 HC RX REV CODE- 250/637: Performed by: FAMILY MEDICINE

## 2019-07-08 PROCEDURE — 85610 PROTHROMBIN TIME: CPT

## 2019-07-08 PROCEDURE — 92526 ORAL FUNCTION THERAPY: CPT

## 2019-07-08 PROCEDURE — 74011250636 HC RX REV CODE- 250/636: Performed by: FAMILY MEDICINE

## 2019-07-08 PROCEDURE — 97110 THERAPEUTIC EXERCISES: CPT

## 2019-07-08 PROCEDURE — 85025 COMPLETE CBC W/AUTO DIFF WBC: CPT

## 2019-07-08 PROCEDURE — 74011000250 HC RX REV CODE- 250: Performed by: FAMILY MEDICINE

## 2019-07-08 PROCEDURE — 77010033678 HC OXYGEN DAILY

## 2019-07-08 PROCEDURE — 74011250637 HC RX REV CODE- 250/637: Performed by: HOSPITALIST

## 2019-07-08 RX ORDER — WARFARIN 2.5 MG/1
2.5 TABLET ORAL ONCE
Status: COMPLETED | OUTPATIENT
Start: 2019-07-08 | End: 2019-07-08

## 2019-07-08 RX ADMIN — DULOXETINE HYDROCHLORIDE 60 MG: 60 CAPSULE, DELAYED RELEASE ORAL at 09:09

## 2019-07-08 RX ADMIN — Medication 250 MG: at 20:21

## 2019-07-08 RX ADMIN — Medication 250 MG: at 09:09

## 2019-07-08 RX ADMIN — Medication 250 MG: at 01:01

## 2019-07-08 RX ADMIN — ATORVASTATIN CALCIUM 40 MG: 20 TABLET, FILM COATED ORAL at 09:09

## 2019-07-08 RX ADMIN — ACETAMINOPHEN 650 MG: 325 TABLET ORAL at 01:09

## 2019-07-08 RX ADMIN — BUPROPION HYDROCHLORIDE 300 MG: 150 TABLET, EXTENDED RELEASE ORAL at 07:12

## 2019-07-08 RX ADMIN — ACETAMINOPHEN 650 MG: 325 TABLET ORAL at 16:13

## 2019-07-08 RX ADMIN — INSULIN LISPRO 2 UNITS: 100 INJECTION, SOLUTION INTRAVENOUS; SUBCUTANEOUS at 16:52

## 2019-07-08 RX ADMIN — FERROUS SULFATE TAB 325 MG (65 MG ELEMENTAL FE) 325 MG: 325 (65 FE) TAB at 09:09

## 2019-07-08 RX ADMIN — INSULIN GLARGINE 10 UNITS: 100 INJECTION, SOLUTION SUBCUTANEOUS at 09:08

## 2019-07-08 RX ADMIN — FLUTICASONE FUROATE AND VILANTEROL TRIFENATATE 1 PUFF: 200; 25 POWDER RESPIRATORY (INHALATION) at 09:30

## 2019-07-08 RX ADMIN — DONEPEZIL HYDROCHLORIDE 10 MG: 5 TABLET, FILM COATED ORAL at 22:32

## 2019-07-08 RX ADMIN — OXYCODONE HYDROCHLORIDE 5 MG: 5 TABLET ORAL at 16:10

## 2019-07-08 RX ADMIN — TAMSULOSIN HYDROCHLORIDE 0.4 MG: 0.4 CAPSULE ORAL at 09:09

## 2019-07-08 RX ADMIN — LOSARTAN POTASSIUM 25 MG: 25 TABLET ORAL at 09:09

## 2019-07-08 RX ADMIN — OXYCODONE HYDROCHLORIDE 5 MG: 5 TABLET ORAL at 01:09

## 2019-07-08 RX ADMIN — OXYCODONE HYDROCHLORIDE 5 MG: 5 TABLET ORAL at 07:17

## 2019-07-08 RX ADMIN — SODIUM CHLORIDE 500 MG: 900 INJECTION, SOLUTION INTRAVENOUS at 13:14

## 2019-07-08 RX ADMIN — ACETAMINOPHEN 650 MG: 325 TABLET ORAL at 07:16

## 2019-07-08 RX ADMIN — DONEPEZIL HYDROCHLORIDE 10 MG: 5 TABLET, FILM COATED ORAL at 01:01

## 2019-07-08 RX ADMIN — MULTIPLE VITAMINS W/ MINERALS TAB 1 TABLET: TAB at 09:09

## 2019-07-08 RX ADMIN — WARFARIN SODIUM 2.5 MG: 2.5 TABLET ORAL at 19:38

## 2019-07-08 RX ADMIN — OMEPRAZOLE 20 MG: 20 CAPSULE, DELAYED RELEASE ORAL at 09:09

## 2019-07-08 RX ADMIN — ONDANSETRON 4 MG: 2 INJECTION INTRAMUSCULAR; INTRAVENOUS at 09:41

## 2019-07-08 RX ADMIN — CEFTRIAXONE 1 G: 1 INJECTION, POWDER, FOR SOLUTION INTRAMUSCULAR; INTRAVENOUS at 12:56

## 2019-07-08 NOTE — PROGRESS NOTES
SHIFT SUMMARY: Pt became short of breath and was assessed by respiratory therapist, who recommended neb tx. Neb Tx order was received from Dr. Nadia Peña, and was given to pt. Pt felt relief of SoB. Bedside and Verbal shift change report given to ANTHONY Gibson (oncoming nurse) by Memo Morris RN  (offgoing nurse). Report included the following information SBAR, Kardex, Intake/Output and MAR.

## 2019-07-08 NOTE — PROGRESS NOTES
Problem: Dysphagia (Adult)  Goal: *Acute Goals and Plan of Care (Insert Text)  Description  Recommendations:  Diet: Soft solid/thin liquid    Meds: Crushed in puree  STRICT Aspiration Precautions  Oral Care TID  Left head turn with all swallows  Alternate solid/liquid     Goals:  Patient will:  1. Tolerate PO trials with 0 s/s overt distress in 4/5 trials  2. Utilize compensatory swallow strategies/maneuvers (decrease bite/sip, size/rate, alt. liq/sol) with min cues in 4/5 trials  3. Perform oral-motor/laryngeal exercises to increase oropharyngeal swallow function with min cues  4. Complete an objective swallow study (i.e., MBSS) to assess swallow integrity, r/o aspiration, and determine of safest LRD, min A (goal met 7/5)   Outcome: Progressing Towards Goal    SPEECH LANGUAGE PATHOLOGY DYSPHAGIA TREATMENT    Patient: Gricelda Robert (15 y.o. male)  Date: 7/8/2019  Diagnosis: COPD exacerbation (HCC) [J44.1]  Pleural effusion [J90]  Weakness [R53.1]  Weakness [R53.1] Weakness       Precautions: Fall, Aspiration  PLOF: Rehab     ASSESSMENT:  Followed up with dysphagia intervention. MBSS completed 6/5; results and recommendations reviewed with pt, verbalized comprehension. A&Ox4. Observed self-feeding thin liquid via cup and solid trials. Independently utilized left head turn with Cy. 0 overt s/sx of aspiration noted. Recommend pt continue soft solid, thin liquid diet. Patient must turn head to the left and alternate solid/liquid. No straws. STRICT aspiration precautions. SLP will continue to follow. Progression toward goals:  ?         Improving appropriately and progressing toward goals  ? Improving slowly and progressing toward goals  ? Not making progress toward goals and plan of care will be adjusted     PLAN:  Recommendations and Planned Interventions:  Soft solids/thin liquid   Patient continues to benefit from skilled intervention to address the above impairments.  Continue treatment per established plan of care. Discharge Recommendations:  Skilled Nursing Facility     SUBJECTIVE:   Patient stated ? I just don't eat much? .    OBJECTIVE:   Cognitive and Communication Status:  Neurologic State: Alert  Orientation Level: Oriented X4  Cognition: Appropriate decision making, Appropriate safety awareness  Perception: Appears intact  Perseveration: No perseveration noted  Safety/Judgement: Awareness of environment  Dysphagia Treatment:  Oral Assessment:  Oral Assessment  Labial: No impairment  Dentition: Intact  Oral Hygiene: Fair  Lingual: No impairment  Velum: No impairment  Mandible: No impairment  P.O. Trials:   Patient Position: EOB   Vocal quality prior to P.O.: No impairment   Consistency Presented: Thin liquid, Pudding, Solid   How Presented: Self-fed/presented, Cup/sip       Bolus Acceptance: No impairment   Bolus Formation/Control: No impairment       Propulsion: No impairment   Oral Residue: None   Initiation of Swallow: No impairment   Laryngeal Elevation: Functional   Aspiration Signs/Symptoms: Strong cough   Pharyngeal Phase Characteristics: Suspected pharyngeal residue   Effective Modifications: Left head turn   Cues for Modifications: Minimal         Oral Phase Severity: Mild   Pharyngeal Phase Severity : Moderate-severe     PAIN:  Pain level pre-treatment: 0/10   Pain level post-treatment: 0/10     After treatment:   ?              Patient left in no apparent distress sitting up in chair  ? Patient left in no apparent distress in bed  ? Call bell left within reach  ? Nursing notified  ? Family present  ? Caregiver present  ? Bed alarm activated      COMMUNICATION/EDUCATION:   ? Aspiration precautions; swallow safety; compensatory techniques  ? Patient unable to participate in education; education ongoing with staff  ? Posted safety precautions in patient's room.   ? Oral-motor/laryngeal strengthening exercises      Ncik Ross, SLP  Time Calculation: 20 mins

## 2019-07-08 NOTE — PROGRESS NOTES
D/C Plan: SNF to LTC    Notified pt has used 92 of 100 days in SNF. CM has requested pt be screened for Medicaid by [employer].  A UAI has been completed. CM spoke with Sen Reji ([phone]), and she has indicated she assists with pt's bills. Notified her that [employer] may be contacting her to assist with screening. Ms. Vanessa Robison has indicated pt does have an estranged wife, but could not offer any information regarding where she is currently residing. CM continuing to follow and assist with SNF to LTC placement. Care Management Interventions  PCP Verified by CM:  Yes  Transition of Care Consult (CM Consult): SNF  Partner SNF: No  Health Maintenance Reviewed: Yes  Physical Therapy Consult: Yes  Speech Therapy Consult: Yes  Current Support Network: Lives Alone  Plan discussed with Pt/Family/Caregiver: Yes  Freedom of Choice Offered: Yes  Discharge Location  Discharge Placement: Skilled nursing facility

## 2019-07-08 NOTE — PROGRESS NOTES
NUTRITION FOLLOW-UP    RECOMMENDATIONS/PLAN:   - Supplements: d/c ensure clear per pt request- at this point unable to give supplements pt reports he gets sick with all of them  Monitor labs/lytes, PO intakes, skin integrity, wt, fluid status, BM    NUTRITION ASSESSMENT:   Client Update: 69 yrs old Male with c/o SOB. Pt with PE, COPD, leukocytosis, diarrhea- c diff neg, and weakness. MD consult nutrition secondary to pt with recent hospitalizations and rehab stay. SLP following     Pt reports that Ensure Enlive gives him diarrhea and Ensure Clear makes him throw up. He denied magic cup. At this point unable to give any supplements. Spoke w/ him about trying to consume all 75% to 100% of all trays         FOOD/NUTRITION INTAKE   Diet Order:  Soft   Supplements: Ensure Clear TID  Food Allergies: NKFA  Average PO Intake:      Patient Vitals for the past 100 hrs:   % Diet Eaten   07/07/19 0942 100 %   07/06/19 0932 100 %   07/05/19 1851 40 %   07/05/19 1550 95 %   07/05/19 0830 40 %      Pertinent Medications:  [x] Reviewed; Electrolyte Replacement Protocol: []K []Mg []PO4  Insulin:  []SSI  []Pre-meal   []Basal    []Drip  []None  Cultural/Congregation Food Preferences: None Identified    BIOCHEMICAL DATA & MEDICAL TESTS  Pertinent Labs:  [x] Reviewed; ANTHROPOMETRICS  Height: 5' 5\" (165.1 cm)       Weight: 51.1 kg (112 lb 11.2 oz)         BMI: 18.8 kg/m^2 normal weight (18.5%-24.9% BMI)   Adm Weight: 107 lbs                Weight change:+5lbs  Adjusted Body Weight: n/a     NUTRITION-FOCUSED PHYSICAL ASSESSMENT  Skin: mild redness to sacrum per documentation      GI: no new BM    NUTRITION PRESCRIPTION  Calories: 1715 kcal/day based on 35kcal/kg  Protein: 59 g/day based on 1.2 g/kg  CHO: 214 g/day based on 50% of total energy  Fluid: 1715 ml/day based on 1 kcal/ml       NUTRITION DIAGNOSES:   1. Inadequate oral intake related to dysphagia/difficulty swallowing as evidenced by wt loss as noted above and aspiration noted on MBS        NUTRITION INTERVENTIONS:   INTERVENTIONS:        GOALS:  1. Supplements: d/c ensure clear per pt request- at this point unable to give supplements pt reports he gets sick with all of them 1. Encourage PO intake >50% at most meals by next review 3 days     LEARNING NEEDS (Diet, Supplementation, Food/Nutrient-Drug Interaction):   [] None Identified   [] Education provided/documented      Identified and patient: [] Declined   [] Was not appropriate/indicated        NUTRITION MONITORING /EVALUATION:   Follow PO intake  Monitor wt  Monitor renal labs, electrolytes, fluid status  Monitor for additional supplement needs     Previous Recommendations Implemented: yes        Previous Goals Met:  yes -diet adv      [] Participated in Interdisciplinary Rounds    [x] Interdisciplinary Care Plan Reviewed  DISCHARGE NUTRITION RECOMMENDATIONS ADDRESSED:     [x] Yes- recommended soft diet     NUTRITION RISK:           [x] At risk                        [] Not currently at risk        Will follow-up per policy.   Nacho Chery 1

## 2019-07-08 NOTE — ED PROVIDER NOTES
EMERGENCY DEPARTMENT HISTORY AND PHYSICAL EXAM    Date: 7/4/2019  Patient Name: Shelley Stephens    History of Presenting Illness     Chief Complaint   Patient presents with    Shortness of Breath         HPI:   2:15 PM  Shelley Stephens is a 69 y.o. male with PMHX of coronary artery disease status post remote CABG, A. fib and is on Coumadin, diabetes, COPD and is O2 dependent at 2 L nasal cannula , history of recurrent pleural effusions and he has had pleurodesis in the past who presents to the emergency department via EMS c/O shortness of breath. EMS report were called by patient at scene due to shortness of breath. EMS reports minimal respiratory distress and patient required Solu-Medrol and a nebulizer treatment prior to ED arrival.  Patient states he just got out of the hospital a day ago to his new residence which he states is to [place] and had difficulty getting into it as he was too weak to walk before he gets short of breath. He uses a wheelchair which he also states it makes him short of breath when he tries ambulation. He thinks he was discharged from rehab to yearly. He also states he has been to 2 other rehab places. He states he was at [facility] for about a month prior to being discharged to the last rehab prior to being discharged home. Patient is also describing difficulties with caring for himself. He gets short of breath with minimal exertion.   He has also, since arriving at his current place been having difficulties caring for himself.      PCP: Tanya Mathews MD    Current Facility-Administered Medications   Medication Dose Route Frequency Provider Last Rate Last Dose    Saccharomyces boulardii (FLORASTOR) capsule 250 mg  250 mg Oral BID Yarely Parmar MD   250 mg at 07/08/19 0909    albuterol-ipratropium (DUO-NEB) 2.5 MG-0.5 MG/3 ML  3 mL Nebulization Q4H PRN Yarely Parmar MD   3 mL at 07/07/19 1305    insulin glargine (LANTUS) injection 10 Units  10 Units SubCUTAneous DAILY Raine Patel MD   10 Units at 07/08/19 0908    cefTRIAXone (ROCEPHIN) 1 g in sterile water (preservative free) 10 mL IV syringe  1 g IntraVENous Q24H Raine Patel MD   1 g at 07/08/19 1256    azithromycin (ZITHROMAX) 500 mg in 0.9% sodium chloride 250 mL IVPB  500 mg IntraVENous Q24H Raine Patel  mL/hr at 07/08/19 1314 500 mg at 07/08/19 1314    ondansetron (ZOFRAN) injection 4 mg  4 mg IntraVENous Q4H PRN Susan Abebe MD   4 mg at 07/08/19 0941    docusate sodium (COLACE) capsule 100 mg  100 mg Oral BID PRN Susan Abebe MD        albuterol (PROVENTIL HFA, VENTOLIN HFA, PROAIR HFA) inhaler 2 Puff  2 Puff Inhalation Q6H PRN Susan Abebe MD   2 Puff at 07/07/19 1100    atorvastatin (LIPITOR) tablet 40 mg  40 mg Oral DAILY Susan Abebe MD   40 mg at 07/08/19 6302    buPROPion XL (WELLBUTRIN XL) tablet 300 mg  300 mg Oral 7am Susan Abebe MD   300 mg at 07/08/19 0506    donepezil (ARICEPT) tablet 10 mg  10 mg Oral QHS Susan Abebe MD   10 mg at 07/08/19 0101    DULoxetine (CYMBALTA) capsule 60 mg  60 mg Oral DAILY Susan Abebe MD   60 mg at 07/08/19 2620    ferrous sulfate tablet 325 mg  325 mg Oral DAILY Susan Abebe MD   325 mg at 07/08/19 6367    fluticasone-vilanterol (BREO ELLIPTA) 200mcg-25mcg/puff  1 Puff Inhalation DAILY Susan Abebe MD   1 Puff at 07/08/19 0930    lidocaine 4 % patch 1 Patch  1 Patch TransDERmal Q24H Susan Abebe MD   1 Patch at 07/08/19 4029    losartan (COZAAR) tablet 25 mg  25 mg Oral DAILY Susan Abebe MD   25 mg at 07/08/19 8974    multivitamin, tx-iron-ca-min (THERA-M w/ IRON) tablet 1 Tab  1 Tab Oral DAILY Susan Abebe MD   1 Tab at 07/08/19 7255    omeprazole (PRILOSEC) capsule 20 mg  20 mg Oral DAILY Susan Abebe MD   20 mg at 07/08/19 6222    tamsulosin (FLOMAX) capsule 0.4 mg  0.4 mg Oral DAILY Susan Abebe MD   0.4 mg at 07/08/19 4928    acetaminophen (TYLENOL) tablet 650 mg  650 mg Oral Q6H PRN Dona SANTACRUZ MD   650 mg at 07/08/19 0716    sodium chloride (OCEAN) 0.65 % nasal squeeze bottle 2 Spray  2 Spray Both Nostrils Q2H PRN Brianna Hernández MD   2 [place] at 07/06/19 1352    oxyCODONE IR (ROXICODONE) tablet 5 mg  5 mg Oral Q6H PRN Brianna Hernández MD   5 mg at 07/08/19 4581    insulin lispro (HUMALOG) injection   SubCUTAneous AC&HS Brianna Hernández MD   Stopped at 07/08/19 0730    glucose chewable tablet 16 g  16 g Oral PRN Brianna Hernández MD        glucagon (GLUCAGEN) injection 1 mg  1 mg IntraMUSCular PRN Brianna Hernández MD        Warfarin- Pharmacy to dose and monitor   Other Rx Dosing/Monitoring Brianna Hernández MD        LORazepam (ATIVAN) tablet 1 mg  1 mg Oral QHS PRN Brianna Hernández MD           Past History     Past Medical History:  Past Medical History:   Diagnosis Date    A-fib [facility])     CAD (coronary artery disease)     Diabetes ([facility] [address]     Pleural effusion        Past Surgical History:  Past Surgical History:   Procedure Laterality Date    CARDIAC SURG PROCEDURE UNLIST      double bypass       Family History:  History reviewed. No pertinent family history. Social History:  Social History     Tobacco Use    Smoking status: Former Smoker    Smokeless tobacco: Never Used   Substance Use Topics    Alcohol use: No     Frequency: Never    Drug use: No       Allergies:  No Known Allergies      Review of Systems   Review of Systems   Constitutional: Negative for chills and fever. HENT: Negative for congestion and sore throat. Respiratory: Positive for cough, shortness of breath and wheezing. Cardiovascular: Negative for chest pain and palpitations. Gastrointestinal: Negative for abdominal pain, nausea and vomiting. Genitourinary: Negative for dysuria, flank pain and frequency. Musculoskeletal: Negative for arthralgias, joint swelling and myalgias.    Skin: Negative for color change and wound. Neurological: Positive for weakness. Negative for dizziness, light-headedness and headaches. Hematological: Negative for adenopathy. Physical Exam     Vitals:    07/07/19 2254 07/08/19 0305 07/08/19 0812 07/08/19 1133   BP: 144/65 132/74 179/68 146/65   Pulse: 75 84 89 87   Resp: 18 18 20 18   Temp: 97.5 °F (36.4 °C) 97.4 °F (36.3 °C) 98 °F (36.7 °C) 97.9 °F (36.6 °C)   SpO2: 100% 99% 98% 98%   Weight:       Height:         Physical Exam   Constitutional: He is oriented to person, place, and time. He appears well-developed and well-nourished. No distress. Chronically ill male in no acute distress   HENT:   Head: Normocephalic and atraumatic. Head is without right periorbital erythema and without left periorbital erythema. Right Ear: External ear normal. No drainage or swelling. Tympanic membrane is not perforated, not erythematous and not bulging. Left Ear: External ear normal. No drainage or swelling. Tympanic membrane is not perforated, not erythematous and not bulging. Nose: Nose normal. No mucosal edema or rhinorrhea. Right sinus exhibits no maxillary sinus tenderness and no frontal sinus tenderness. Left sinus exhibits no maxillary sinus tenderness and no frontal sinus tenderness. Mouth/Throat: Uvula is midline, oropharynx is clear and moist and mucous membranes are normal. No oral lesions. No trismus in the jaw. No dental abscesses or uvula swelling. No oropharyngeal exudate, posterior oropharyngeal edema, posterior oropharyngeal erythema or tonsillar abscesses. Eyes: Conjunctivae are normal. Right eye exhibits no discharge. Left eye exhibits no discharge. No scleral icterus. Neck: Normal range of motion. Neck supple. Cardiovascular: Normal rate, regular rhythm, normal heart sounds and intact distal pulses. Exam reveals no gallop and no friction rub. No murmur heard.   Pulmonary/Chest: Effort normal and breath sounds normal. No accessory muscle usage. No tachypnea. No respiratory distress. He has no decreased breath sounds. He has no wheezes. He has no rhonchi. He has no rales. Some scattered faint expiratory wheezes diffusely, still has minimal subcostal retractions. Abdominal: Soft. Bowel sounds are normal. He exhibits no distension. There is no tenderness. Musculoskeletal: Normal range of motion. He exhibits no edema or tenderness. Lymphadenopathy:     He has no cervical adenopathy. Neurological: He is alert and oriented to person, place, and time. No cranial nerve deficit. Some mild generalized weakness   Skin: Skin is warm and dry. He is not diaphoretic. Psychiatric: He has a normal mood and affect. Judgment normal.   Nursing note and vitals reviewed.           Diagnostic Study Results     Labs -     Recent Results (from the past 12 hour(s))   METABOLIC PANEL, BASIC    Collection Time: 07/08/19  4:35 AM   Result Value Ref Range    Sodium 140 136 - 145 mmol/L    Potassium 3.6 3.5 - 5.5 mmol/L    Chloride 97 (L) 100 - 108 mmol/L    CO2 41 (HH) 21 - 32 mmol/L    Anion gap 2 (L) 3.0 - 18 mmol/L    Glucose 90 74 - 99 mg/dL    BUN 9 7.0 - 18 MG/DL    Creatinine 0.48 (L) 0.6 - 1.3 MG/DL    BUN/Creatinine ratio 19 12 - 20      GFR est AA >60 >60 ml/min/1.73m2    GFR est non-AA >60 >60 ml/min/1.73m2    Calcium 9.2 8.5 - 10.1 MG/DL   PROTHROMBIN TIME + INR    Collection Time: 07/08/19  4:35 AM   Result Value Ref Range    Prothrombin time 19.5 (H) 11.5 - 15.2 sec    INR 1.7 (H) 0.8 - 1.2     CBC WITH AUTOMATED DIFF    Collection Time: 07/08/19  4:35 AM   Result Value Ref Range    WBC 11.0 4.6 - 13.2 K/uL    RBC 2.86 (L) 4.70 - 5.50 M/uL    HGB 8.8 (L) 13.0 - 16.0 g/dL    HCT 29.5 (L) 36.0 - 48.0 %    .1 (H) 74.0 - 97.0 FL    MCH 30.8 24.0 - 34.0 PG    MCHC 29.8 (L) 31.0 - 37.0 g/dL    RDW 19.7 (H) 11.6 - 14.5 %    PLATELET 702 883 - 307 K/uL    MPV 9.8 9.2 - 11.8 FL    NEUTROPHILS 72 40 - 73 %    LYMPHOCYTES 10 (L) 21 - 52 %    MONOCYTES 15 (H) 3 - 10 %    EOSINOPHILS 3 0 - 5 %    BASOPHILS 0 0 - 2 %    ABS. NEUTROPHILS 7.9 1.8 - 8.0 K/UL    ABS. LYMPHOCYTES 1.1 0.9 - 3.6 K/UL    ABS. MONOCYTES 1.6 (H) 0.05 - 1.2 K/UL    ABS. EOSINOPHILS 0.3 0.0 - 0.4 K/UL    ABS. BASOPHILS 0.0 0.0 - 0.1 K/UL    DF AUTOMATED     GLUCOSE, POC    Collection Time: 07/08/19  8:16 AM   Result Value Ref Range    Glucose (POC) 112 (H) 70 - 110 mg/dL   GLUCOSE, POC    Collection Time: 07/08/19 11:37 AM   Result Value Ref Range    Glucose (POC) 145 (H) 70 - 110 mg/dL       Radiologic Studies -     XR SWALLOW FUNC VIDEO   Final Result   IMPRESSION:      1. Trace laryngeal penetration with thin liquids administered by cup. No   aspiration. Please refer to the separate report and recommendations from the speech   pathologist.      XR CHEST PORT   Final Result   IMPRESSION:         1. Atelectasis/scarring left lower lobe appears worse than prior. Patient   apparently has had left lung surgery. 2. Right pleural effusion appears small. Atelectasis right lower lobe improved.         CT Results  (Last 48 hours)    None        CXR Results  (Last 48 hours)    None          Medications given in the ED-  Medications   ondansetron (ZOFRAN) injection 4 mg (4 mg IntraVENous Given 7/8/19 0941)   docusate sodium (COLACE) capsule 100 mg (has no administration in time range)   albuterol (PROVENTIL HFA, VENTOLIN HFA, PROAIR HFA) inhaler 2 Puff (2 Puffs Inhalation Given 7/7/19 1100)   atorvastatin (LIPITOR) tablet 40 mg (40 mg Oral Given 7/8/19 0909)   buPROPion XL (WELLBUTRIN XL) tablet 300 mg (300 mg Oral Given 7/8/19 0712)   donepezil (ARICEPT) tablet 10 mg (10 mg Oral Given 7/8/19 0101)   DULoxetine (CYMBALTA) capsule 60 mg (60 mg Oral Given 7/8/19 0909)   ferrous sulfate tablet 325 mg (325 mg Oral Given 7/8/19 0909)   fluticasone-vilanterol (BREO ELLIPTA) 200mcg-25mcg/puff (1 Puff Inhalation Given 7/8/19 0930)   lidocaine 4 % patch 1 Patch (1 Patch TransDERmal Apply Patch 7/8/19 0907) losartan (COZAAR) tablet 25 mg (25 mg Oral Given 7/8/19 0909)   multivitamin, tx-iron-ca-min (THERA-M w/ IRON) tablet 1 Tab (1 Tab Oral Given 7/8/19 0909)   omeprazole (PRILOSEC) capsule 20 mg (20 mg Oral Given 7/8/19 0909)   tamsulosin (FLOMAX) capsule 0.4 mg (0.4 mg Oral Given 7/8/19 0909)   acetaminophen (TYLENOL) tablet 650 mg (650 mg Oral Given 7/8/19 0716)   sodium chloride (OCEAN) 0.65 % nasal squeeze bottle 2 Spray (2 Sprays Both Nostrils Given 7/6/19 1352)   oxyCODONE IR (ROXICODONE) tablet 5 mg (5 mg Oral Given 7/8/19 0717)   insulin lispro (HUMALOG) injection (0 Units SubCUTAneous Held 7/8/19 1130)   glucose chewable tablet 16 g (has no administration in time range)   glucagon (GLUCAGEN) injection 1 mg (has no administration in time range)   Warfarin- Pharmacy to dose and monitor (has no administration in time range)   LORazepam (ATIVAN) tablet 1 mg (has no administration in time range)   insulin glargine (LANTUS) injection 10 Units (10 Units SubCUTAneous Given 7/8/19 0908)   cefTRIAXone (ROCEPHIN) 1 g in sterile water (preservative free) 10 mL IV syringe (1 g IntraVENous Given 7/8/19 1256)   azithromycin (ZITHROMAX) 500 mg in 0.9% sodium chloride 250 mL IVPB (500 mg IntraVENous New Bag 7/8/19 1314)   Saccharomyces boulardii (FLORASTOR) capsule 250 mg (250 mg Oral Given 7/8/19 0909)   albuterol-ipratropium (DUO-NEB) 2.5 MG-0.5 MG/3 ML (3 mL Nebulization Given 7/7/19 1305)   methylPREDNISolone (PF) (Solu-MEDROL) injection 125 mg (125 mg IntraVENous Given 7/4/19 2130)   albuterol-ipratropium (DUO-NEB) 2.5 MG-0.5 MG/3 ML (3 mL Nebulization Given 7/4/19 2050)   warfarin (COUMADIN) tablet 1.5 mg (1.5 mg Oral Given 7/5/19 7029)   barium sulfate (VARIBAR PUDDING) 40 % (w/v), 30% (w/w) contrast oral paste 1-10 mL (10 mL Oral Given 7/5/19 1250)   barium sulfate (EZ PAQUE) 96 % (w/w) contrast suspension 1-100 g (45 g Oral Given 7/5/19 1250)   warfarin (COUMADIN) tablet 1.5 mg (1.5 mg Oral Given 7/5/19 5800) warfarin (COUMADIN) tablet 2 mg (2 mg Oral Given 7/6/19 1814)   warfarin (COUMADIN) tablet 2.5 mg (2.5 mg Oral Given 7/7/19 1735)         Medical Decision Making   I am the first provider for this patient. I reviewed the vital signs, available nursing notes, past medical history, past surgical history, family history and social history. Vital Signs-Reviewed the patient's vital signs. Pulse Oximetry Analysis - 98% on 2L NC     Cardiac Monitor:  Rate: 87 bpm  Rhythm: Afib    EKG interpretation: (Preliminary)  20:37  Afib, Rate 96, Nonspecific ST-T      Records Reviewed: Nursing Notes and Old Medical Records    Provider Notes (Medical Decision Making):   Reviewed patient's old medical records, patient debilitated with a history of COPD, asbestosis, recurrent pleural effusions and is status post pleurodesis, is O2 dependent, has history of type 2 diabetes mellitus, A. fib and is on Coumadin, hypertension, CAD and is status post CABG in the past, history of depression and some mild dementia. Patient with shortness of breath with minimal exertion, is weak, apparently called EMS due to shortness of breath. EMS report patient wheezing at scene requiring Solu-Medrol and DuoNeb prior to ED arrival.    Patient still wheezing on ED arrival with some minimal retractions requiring additional nebulizer treatments. Patient's main problem however seems to be difficulties with caring for himself. Shortness of breath resolved with nebulizer treatments, will consult hospitalist admission for COPD exacerbation and possible consult with care management for placement. Procedures:  Procedures    ED Course:   2:15 PM Initial assessment performed. The patients presenting problems have been discussed, and they are in agreement with the care plan formulated and outlined with them. I have encouraged them to ask questions as they arise throughout their visit.     Diagnosis and Disposition   Critical Care Time:     Core Measures:  For Hospitalized Patients:    1. Hospitalization Decision Time:  The decision to hospitalize the patient was made  at 23:40 on 7/4/2019    2. Aspirin: Not given    2:41 PM  Patient is being admitted to the hospital by Deb Starr. The results of their tests and reasons for their admission have been discussed with them and/or available family. They convey agreement and understanding for the need to be admitted and for their admission diagnosis. CONDITIONS ON ADMISSION:  Sepsis is not present at the time of admission. Deep Vein Thrombosis is not present at the time of admission. Thrombosis is not present at the time of admission. Urinary Tract Infection is not present at the time of admission. Pneumonia is not present at the time of admission. MRSA is not present at the time of admission. Wound infection is not present at the time of admission. Pressure Ulcer is not present at the time of admission. CLINICAL IMPRESSION:    1. COPD exacerbation ([address]        PLAN:  1. D/C Home  2. Current Discharge Medication List        3.    Follow-up Information     Follow up With Specialties Details Why [address] [place] [address],[address]  [facility]  [phone]

## 2019-07-08 NOTE — PROGRESS NOTES
July 8, 2019      [address]  [address]   [address]    Dear Naye Diaz: [facility] (Medicaid) requires that any individual seeking admission to a nursing facility, assisted living facility, or a home- and community-based waiver be evaluated to determine whether the individual requires that level of services, if the individual is financially Medicaid eligible, or expects to become Medicaid eligible within 180 days of the beginning date of services. If the individual is Medicaid eligible at the time of application or expects to become Medicaid eligible within 180 days of the beginning date of services, the screening must be completed. Medicaid contracts with several different agencies to perform pre-admission screening using the level-of-care criteria, assessment tool, and procedures established by Medicaid. Once the screening team determines an individual meets the criteria for nursing facility admission, the screening team considers the appropriate setting for the delivery of care. The Pre-Admission Screening Team, in accordance with Medicaid policy and procedures, has determined that you meet the level-of-care criteria necessary for Medicaid-funded long-term care. The screening team discussed with you the choice of nursing facility care or community-based care services, and it was determined that nursing facility care would best meet your needs at the present time. The nursing facility is responsible for assessing your needs upon admission and periodically thereafter in order to demonstrate that you continue to meet the nursing facility criteria.      If you have been denied services, you may appeal this decision by writing to the Recipient Appeals Unit, Department of [facility], [address], [address] [facility], [address] [place] [address], of your desire to appeal within thirty (30) days of receipt of this decision letter. You have the right to appear in person for this appeal and you have the right to have someone represent you at the appeal, such as a  or other person.      Sincerely,     ROLANDA NavasN, RN  [place] [phone] Screening Committee

## 2019-07-08 NOTE — ROUTINE PROCESS
19:35: Received verbal/bedside change of shift report from Yamel García RN. Report included SBAR, KARDEX, MAR and recent results. 20:10: Pt received resting quietly in bed and in NAD. Respirations even and unlabored, skin W&D. 02 @ 2 l/m via NC infusing. SR up X2 for safety. Denies C/O at this time. Monitoring ongoing. 04:00: Pt requested and received PRN roxicodone PO at this time for pain he rated as being 7/10 on numeric pain scale. 06:30: Pt slept soundly entire shift and appears to be in NAD. Denies C/O. Bedside, Verbal and Written shift change report given to Corinne Parham RN (oncoming nurse) by Norbert Sood. Gilbert Cui (offgoing nurse). Report included the following information SBAR, Kardex, MAR and Recent Results.

## 2019-07-08 NOTE — PROGRESS NOTES
Pharmacy Dosing Services: Warfarin    Consult for Warfarin Dosing by Pharmacy by Dr. Merline Del Valle provided for this 69 y.o.  male , for indication of Atrial Fibrillation. Day of Therapy (continuation of home med)  Dose to achieve an INR goal of 2-3    Order entered for Warfarin 2.5 mg to be given today at 18:00. Significant drug interactions: azithromycin  LABS    PT/INR Lab Results   Component Value Date/Time    INR 1.7 (H) 07/08/2019 04:35 AM        Platelets Lab Results   Component Value Date/Time    PLATELET 951 66/19/9145 04:35 AM        H/H     Lab Results   Component Value Date/Time    HGB 8.8 (L) 07/08/2019 04:35 AM          Warfarin Administrations (last 168 hours)       Date/Time Action Medication Dose    07/07/19 1735 Given   [INR 1.3]    warfarin (COUMADIN) tablet 2.5 mg 2.5 mg    07/06/19 1814 Given    warfarin (COUMADIN) tablet 2 mg 2 mg    07/05/19 1750 Given    warfarin (COUMADIN) tablet 1.5 mg 1.5 mg    07/05/19 0259 Given    warfarin (COUMADIN) tablet 1.5 mg 1.5 mg          Pharmacy to follow daily and will provide subsequent Warfarin dosing based on clinical status.   [place], [place]  Contact information [phone]

## 2019-07-08 NOTE — PROGRESS NOTES
Occupational Therapy Screening:  Services are indicated. Evaluate and Treat Order is requested. An InBasket screening referral was triggered for occupational therapy based on results obtained during the nursing admission assessment. The patients chart was reviewed and the patient is appropriate for a skilled therapy evaluation. Patient was admitted with COPD exacerbation. Pt lives alone in 2nd floor apartment and may benefit from Energy Conservation/Work Simplification Techniques. Please order a consult for occupational therapy if you are in agreement and would like an evaluation to be completed. Thank you.   Miesha Shahid, OTR/L

## 2019-07-08 NOTE — PROGRESS NOTES
[facility] MAR and Kardex. Shift summary-no change in pt status, up with PT.  Bedside and Verbal shift change report given to Azeb Madrid RN (oncoming nurse) by Ariane Griffith RN (offgoing nurse). Report included the following information SBAR, Kardex and MAR.

## 2019-07-08 NOTE — PROGRESS NOTES
Hospitalist Progress Note    Patient: Luzma Calvillo MRN: [id number]  CSN: [id number]    YOB: 1945  Age: 69 y.o. Sex: male    DOA: 7/4/2019 LOS:  LOS: 1 day                Assessment/Plan     Patient Active Problem List   Diagnosis Code    Pleural effusion J90    COPD exacerbation ([facility] J44.1    Weakness R53.1    A-fib ([facility] I48.91    Diabetes ([facility] E11.9    CAD (coronary artery disease) I25.10    Leukocytosis D72.829    Chronic respiratory failure ([facility] J96.10            70 yo debilitated WM recent hospital stay at [facility] for hydropneumothorax then extended rehab stay was discharged to apartment where he made it only a few days then had to call EMS to get him as he was not able to care for himself. diarrhea, malnourished, chronic lung disease, increased wbc count. Still with weakness but better. No acute events overnight. Weakness- continue with PT/OT    Chronic home oxygen use -  continue 02, COPD,     Atrial fibrillation - chronic persistent, resumed warfarin, follow INR    Coronary artery disease - no chest pain or anginal symptoms    NIDDM2 WITH HYPERGLYCEMIA - BG now better, continue with insulin    History of dysphagia - MBS, speech therapy    Leukocytosis - treating empirically for poss pneumonia, UA with no evidence of infection. Diarrhea - c diff neg-add probiotics    Severe prot panchito malnutrition - nutrition consulting-advanced weight loss in last few months          Disposition : 1-2 days to rehab    Review of systems  General: No fevers or chills. Cardiovascular: No chest pain or pressure. No palpitations. Pulmonary: No shortness of breath. Gastrointestinal: No nausea, vomiting. Physical Exam:  General: Awake, cooperative, no acute distress    HEENT: NC, Atraumatic. PERRLA, anicteric sclerae. Lungs: CTA Bilaterally. No Wheezing/Rhonchi/Rales.   Heart:  S1 S2,  No murmur, No Rubs, No Gallops  Abdomen: Soft, Non distended, Non tender.  +Bowel sounds, Extremities: No c/c/e  Psych:   Not anxious or agitated. Neurologic:  No acute neurological deficit. Vital signs/Intake and Output:  Visit Vitals  /62   Pulse 69   Temp 97.6 °F (36.4 °C)   Resp 18   Ht 5' 5\" (1.651 m)   Wt 51.1 kg (112 lb 11.2 oz)   SpO2 98%   BMI 18.75 kg/m²     Current Shift:  07/08 0701 - 07/08 1900  In: -   Out: 100 [Urine:100]  Last three shifts:  07/06 1901 - 07/08 0700  In: 1480 [P.O.:960; I.V.:520]  Out: 600 [Urine:600]            Labs: Results:       Chemistry Recent Labs     07/08/19 0435 07/07/19 0232 07/06/19  0248   GLU 90 86 56*    141 142   K 3.6 3.8 4.1   CL 97* 98* 102   CO2 41* 41* 38*   BUN 9 10 15   CREA 0.48* 0.39* 0.46*   CA 9.2 9.1 9.0   AGAP 2* 2* 2*   BUCR 19 26* 33*      CBC w/Diff Recent Labs     07/08/19 0435 07/07/19 0232 07/06/19  0248   WBC 11.0 9.1 15.6*   RBC 2.86* 2.89* 2.90*   HGB 8.8* 8.9* 9.1*   HCT 29.5* 30.1* 30.1*    362 391   GRANS 72 75  --    LYMPH 10* 11*  --    EOS 3 2  --       Cardiac Enzymes No results for input(s): CPK, CKND1, ROWAN in the last 72 hours. No lab exists for component: CKRMB, TROIP   Coagulation Recent Labs     07/08/19 0435 07/07/19 0232   PTP 19.5* 15.5*   INR 1.7* 1.3*       Lipid Panel No results found for: CHOL, CHOLPOCT, CHOLX, CHLST, CHOLV, 017123, HDL, LDL, LDLC, DLDLP, 258522, VLDLC, VLDL, TGLX, TRIGL, TRIGP, TGLPOCT, CHHD, CHHDX   BNP No results for input(s): BNPP in the last 72 hours. Liver Enzymes No results for input(s): TP, ALB, TBIL, AP, SGOT, GPT in the last 72 hours.     No lab exists for component: DBIL   Thyroid Studies No results found for: T4, T3U, TSH, TSHEXT     Procedures/imaging: see electronic medical records for all procedures/Xrays and details which were not copied into this note but were reviewed prior to creation of Plan

## 2019-07-09 LAB
BASOPHILS # BLD: 0 K/UL (ref 0–0.1)
BASOPHILS NFR BLD: 0 % (ref 0–2)
DIFFERENTIAL METHOD BLD: ABNORMAL
EOSINOPHIL # BLD: 0.2 K/UL (ref 0–0.4)
EOSINOPHIL NFR BLD: 2 % (ref 0–5)
ERYTHROCYTE [DISTWIDTH] IN BLOOD BY AUTOMATED COUNT: 19.9 % (ref 11.6–14.5)
GLUCOSE BLD STRIP.AUTO-MCNC: 142 MG/DL (ref 70–110)
GLUCOSE BLD STRIP.AUTO-MCNC: 174 MG/DL (ref 70–110)
GLUCOSE BLD STRIP.AUTO-MCNC: 181 MG/DL (ref 70–110)
GLUCOSE BLD STRIP.AUTO-MCNC: 217 MG/DL (ref 70–110)
HCT VFR BLD AUTO: 31.7 % (ref 36–48)
HGB BLD-MCNC: 9.6 G/DL (ref 13–16)
INR PPP: 2.2 (ref 0.8–1.2)
LYMPHOCYTES # BLD: 0.9 K/UL (ref 0.9–3.6)
LYMPHOCYTES NFR BLD: 9 % (ref 21–52)
MCH RBC QN AUTO: 31.1 PG (ref 24–34)
MCHC RBC AUTO-ENTMCNC: 30.3 G/DL (ref 31–37)
MCV RBC AUTO: 102.6 FL (ref 74–97)
MONOCYTES # BLD: 1.2 K/UL (ref 0.05–1.2)
MONOCYTES NFR BLD: 12 % (ref 3–10)
NEUTS SEG # BLD: 7.6 K/UL (ref 1.8–8)
NEUTS SEG NFR BLD: 77 % (ref 40–73)
PLATELET # BLD AUTO: 355 K/UL (ref 135–420)
PMV BLD AUTO: 10.2 FL (ref 9.2–11.8)
PROTHROMBIN TIME: 24.4 SEC (ref 11.5–15.2)
RBC # BLD AUTO: 3.09 M/UL (ref 4.7–5.5)
WBC # BLD AUTO: 9.9 K/UL (ref 4.6–13.2)

## 2019-07-09 PROCEDURE — 77010033678 HC OXYGEN DAILY

## 2019-07-09 PROCEDURE — 82962 GLUCOSE BLOOD TEST: CPT

## 2019-07-09 PROCEDURE — 85025 COMPLETE CBC W/AUTO DIFF WBC: CPT

## 2019-07-09 PROCEDURE — 65270000029 HC RM PRIVATE

## 2019-07-09 PROCEDURE — 94760 N-INVAS EAR/PLS OXIMETRY 1: CPT

## 2019-07-09 PROCEDURE — 74011000250 HC RX REV CODE- 250: Performed by: HOSPITALIST

## 2019-07-09 PROCEDURE — 97116 GAIT TRAINING THERAPY: CPT

## 2019-07-09 PROCEDURE — 77030032490 HC SLV COMPR SCD KNE COVD -B

## 2019-07-09 PROCEDURE — 74011636637 HC RX REV CODE- 636/637: Performed by: HOSPITALIST

## 2019-07-09 PROCEDURE — 85610 PROTHROMBIN TIME: CPT

## 2019-07-09 PROCEDURE — 97530 THERAPEUTIC ACTIVITIES: CPT

## 2019-07-09 PROCEDURE — 74011636637 HC RX REV CODE- 636/637: Performed by: FAMILY MEDICINE

## 2019-07-09 PROCEDURE — 74011250637 HC RX REV CODE- 250/637: Performed by: HOSPITALIST

## 2019-07-09 PROCEDURE — 94640 AIRWAY INHALATION TREATMENT: CPT

## 2019-07-09 PROCEDURE — 74011250636 HC RX REV CODE- 250/636: Performed by: HOSPITALIST

## 2019-07-09 PROCEDURE — 36415 COLL VENOUS BLD VENIPUNCTURE: CPT

## 2019-07-09 PROCEDURE — 74011000250 HC RX REV CODE- 250: Performed by: FAMILY MEDICINE

## 2019-07-09 PROCEDURE — 92526 ORAL FUNCTION THERAPY: CPT

## 2019-07-09 PROCEDURE — 74011250637 HC RX REV CODE- 250/637: Performed by: FAMILY MEDICINE

## 2019-07-09 RX ORDER — WARFARIN 1 MG/1
0.5 TABLET ORAL ONCE
Status: COMPLETED | OUTPATIENT
Start: 2019-07-09 | End: 2019-07-09

## 2019-07-09 RX ADMIN — OXYCODONE HYDROCHLORIDE 5 MG: 5 TABLET ORAL at 13:06

## 2019-07-09 RX ADMIN — Medication 250 MG: at 21:18

## 2019-07-09 RX ADMIN — SODIUM CHLORIDE 500 MG: 900 INJECTION, SOLUTION INTRAVENOUS at 13:06

## 2019-07-09 RX ADMIN — LOSARTAN POTASSIUM 25 MG: 25 TABLET ORAL at 08:32

## 2019-07-09 RX ADMIN — ACETAMINOPHEN 650 MG: 325 TABLET ORAL at 16:29

## 2019-07-09 RX ADMIN — WARFARIN SODIUM 0.5 MG: 1 TABLET ORAL at 18:07

## 2019-07-09 RX ADMIN — MULTIPLE VITAMINS W/ MINERALS TAB 1 TABLET: TAB at 08:32

## 2019-07-09 RX ADMIN — Medication 250 MG: at 08:32

## 2019-07-09 RX ADMIN — INSULIN LISPRO 2 UNITS: 100 INJECTION, SOLUTION INTRAVENOUS; SUBCUTANEOUS at 12:51

## 2019-07-09 RX ADMIN — BUPROPION HYDROCHLORIDE 300 MG: 150 TABLET, EXTENDED RELEASE ORAL at 07:32

## 2019-07-09 RX ADMIN — INSULIN LISPRO 2 UNITS: 100 INJECTION, SOLUTION INTRAVENOUS; SUBCUTANEOUS at 16:29

## 2019-07-09 RX ADMIN — CEFTRIAXONE 1 G: 1 INJECTION, POWDER, FOR SOLUTION INTRAMUSCULAR; INTRAVENOUS at 12:51

## 2019-07-09 RX ADMIN — IPRATROPIUM BROMIDE AND ALBUTEROL SULFATE 3 ML: .5; 3 SOLUTION RESPIRATORY (INHALATION) at 08:56

## 2019-07-09 RX ADMIN — OXYCODONE HYDROCHLORIDE 5 MG: 5 TABLET ORAL at 04:08

## 2019-07-09 RX ADMIN — OXYCODONE HYDROCHLORIDE 5 MG: 5 TABLET ORAL at 19:40

## 2019-07-09 RX ADMIN — ATORVASTATIN CALCIUM 40 MG: 20 TABLET, FILM COATED ORAL at 08:31

## 2019-07-09 RX ADMIN — OMEPRAZOLE 20 MG: 20 CAPSULE, DELAYED RELEASE ORAL at 08:32

## 2019-07-09 RX ADMIN — FERROUS SULFATE TAB 325 MG (65 MG ELEMENTAL FE) 325 MG: 325 (65 FE) TAB at 08:32

## 2019-07-09 RX ADMIN — INSULIN LISPRO 4 UNITS: 100 INJECTION, SOLUTION INTRAVENOUS; SUBCUTANEOUS at 21:19

## 2019-07-09 RX ADMIN — DULOXETINE HYDROCHLORIDE 60 MG: 60 CAPSULE, DELAYED RELEASE ORAL at 08:32

## 2019-07-09 RX ADMIN — FLUTICASONE FUROATE AND VILANTEROL TRIFENATATE 1 PUFF: 200; 25 POWDER RESPIRATORY (INHALATION) at 08:33

## 2019-07-09 RX ADMIN — ACETAMINOPHEN 650 MG: 325 TABLET ORAL at 05:22

## 2019-07-09 RX ADMIN — INSULIN GLARGINE 10 UNITS: 100 INJECTION, SOLUTION SUBCUTANEOUS at 08:33

## 2019-07-09 RX ADMIN — DONEPEZIL HYDROCHLORIDE 10 MG: 5 TABLET, FILM COATED ORAL at 21:18

## 2019-07-09 RX ADMIN — TAMSULOSIN HYDROCHLORIDE 0.4 MG: 0.4 CAPSULE ORAL at 08:32

## 2019-07-09 NOTE — PROGRESS NOTES
Problem: Dysphagia (Adult)  Goal: *Acute Goals and Plan of Care (Insert Text)  Description  Recommendations:  Diet: Soft solid/thin liquid    Meds: Crushed in puree  STRICT Aspiration Precautions  Oral Care TID  Left head turn with all swallows  Alternate solid/liquid     Goals:  Patient will:  1. Tolerate PO trials with 0 s/s overt distress in 4/5 trials  2. Utilize compensatory swallow strategies/maneuvers (decrease bite/sip, size/rate, alt. liq/sol) with min cues in 4/5 trials  3. Perform oral-motor/laryngeal exercises to increase oropharyngeal swallow function with min cues  4. Complete an objective swallow study (i.e., MBSS) to assess swallow integrity, r/o aspiration, and determine of safest LRD, min A (goal met 7/5)   Outcome: Progressing Towards Goal    SPEECH LANGUAGE PATHOLOGY DYSPHAGIA TREATMENT    Patient: Abby Diego (2 y.o. male)  Date: 7/9/2019  Diagnosis: COPD exacerbation (HCC) [J44.1]  Pleural effusion [J90]  Weakness [R53.1]  Weakness [R53.1] Weakness       Precautions: Fall, Aspiration  PLOF: Rehab    ASSESSMENT:  Followed up with dysphagia intervention. MBSS completed 6/5; results and recommendations reviewed with pt, verbalized comprehension. Reported + tolerance of recent meals. A&Ox4. Observed self-feeding thin liquid via cup and solid trials. Independently utilized left head turn with Cy. 0 overt s/sx of aspiration noted. Recommend pt continue soft solid, thin liquid diet. Patient must turn head to the left and alternate solid/liquid. No straws. STRICT aspiration precautions. SLP will continue to follow. Progression toward goals:  ?         Improving appropriately and progressing toward goals  ? Improving slowly and progressing toward goals  ?          Not making progress toward goals and plan of care will be adjusted     PLAN:  Recommendations and Planned Interventions:  Soft solid/thin liquid   Patient continues to benefit from skilled intervention to address the above impairments. Continue treatment per established plan of care. Discharge Recommendations:  Skilled Nursing Facility     SUBJECTIVE:   Patient stated ? I'm not a big eater? .    OBJECTIVE:   Cognitive and Communication Status:  Neurologic State: Alert  Orientation Level: Oriented X4  Cognition: Appropriate decision making  Perception: Appears intact  Perseveration: No perseveration noted  Safety/Judgement: Awareness of environment  Dysphagia Treatment:  Oral Assessment:  Oral Assessment  Labial: No impairment  Dentition: Intact  Oral Hygiene: Fair  Lingual: No impairment  Velum: No impairment  Mandible: No impairment  P.O. Trials:   Patient Position: EOB   Vocal quality prior to P.O.: No impairment   Consistency Presented: Thin liquid, Pudding, Solid   How Presented: Self-fed/presented, Cup/sip       Bolus Acceptance: No impairment   Bolus Formation/Control: No impairment       Propulsion: No impairment   Oral Residue: None   Initiation of Swallow: No impairment   Laryngeal Elevation: Functional   Aspiration Signs/Symptoms: Strong cough   Pharyngeal Phase Characteristics: Suspected pharyngeal residue   Effective Modifications: Left head turn   Cues for Modifications: Minimal         Oral Phase Severity: Mild   Pharyngeal Phase Severity : Moderate-severe    PAIN:  Pain level pre-treatment: 0/10   Pain level post-treatment: 0/10     After treatment:   ?              Patient left in no apparent distress sitting up in chair  ? Patient left in no apparent distress in bed  ? Call bell left within reach  ? Nursing notified  ? Family present  ? Caregiver present  ? Bed alarm activated      COMMUNICATION/EDUCATION:   ? Aspiration precautions; swallow safety; compensatory techniques  ? Patient unable to participate in education; education ongoing with staff  ? Posted safety precautions in patient's room.   ? Oral-motor/laryngeal strengthening exercises      CAROLYNN Lovell  Time Calculation: 10 mins

## 2019-07-09 NOTE — PROGRESS NOTES
Hospitalist Progress Note    Patient: Rae Low MRN: [id number]  CSN: [id number]    YOB: 1945  Age: 69 y.o. Sex: male    DOA: 7/4/2019 LOS:  LOS: 2 days          Chief Complaint:    Weakness    Assessment/Plan     1. Weakness  2. Chronic home oxygen use  3. Atrial fibrillation  4. Coronary artery disease  5. NIDDM2  6. Hx of dysphagia  7. Leukocytosis  8. Diarrhea  9. Severe protein calorie malnutrition    1. Continue PT/OT, case management working on safe disposition for patient. PT recommending SNF. 2. Continued supplemental oxygen use. 3. Rate controlled, continue warfarin dosed per pharmacy. 4. No chest pain at time of exam.   5. Blood glucose levels reasonably well controlled. 6. Seen by SLP and completed MBS. On dental soft diet. 7. Continue empiric antibiotics, leukocytosis resolved. 8. C.diff negative, continue probiotics. 9. No changes. DVT prophylaxis with warfarin. Disposition: SNF when accepting facility is found.      Patient Active Problem List   Diagnosis Code    Pleural effusion J90    COPD exacerbation ([facility] J44.1    Weakness R53.1    A-fib ([facility]) I48.91    Diabetes ([facility] E11.9    CAD (coronary artery disease) I25.10    Leukocytosis D72.829    Chronic respiratory failure ([facility]) J96.10       Subjective:    Wants to get out of hospital but does not want to go home     Review of systems:    Constitutional: denies fevers, chills, myalgias  Respiratory: +SOB, -cough  Cardiovascular: denies chest pain, palpitations  Gastrointestinal: denies nausea, vomiting, diarrhea      Vital signs/Intake and Output:  Visit Vitals  /72   Pulse (!) 53   Temp 98 °F (36.7 °C)   Resp 18   Ht 5' 5\" (1.651 m)   Wt 55.8 kg (123 lb)   SpO2 99%   BMI 20.47 kg/m²     Current Shift:  07/09 0701 - 07/09 1900  In: -   Out: 125 [Urine:125]  Last three shifts:  07/07 1901 - 07/09 0700  In: 750 [P.O.:480; I.V.:270]  Out: 675 [Urine:675]    Exam:    General: Elderly, debilitated, frail white male, alert, NAD, OX3  Head/Neck: NCAT, supple, No masses, No lymphadenopathy  CVS:irregularly irregular, no M/R/G, S1/S2 heard, no thrill  Lungs:Clear to auscultation bilaterally, no wheezes, rhonchi, or rales  Abdomen: Soft, Nontender, No distention, Normal Bowel sounds, No hepatomegaly  Extremities: No C/C/E, pulses palpable 2+  Skin:normal texture and turgor, no rashes, no lesions  Neuro:grossly normal , follows commands  Psych:appropriate                Labs: Results:       Chemistry Recent Labs     07/08/19  0435 07/07/19  0232   GLU 90 86    141   K 3.6 3.8   CL 97* 98*   CO2 41* 41*   BUN 9 10   CREA 0.48* 0.39*   CA 9.2 9.1   AGAP 2* 2*   BUCR 19 26*      CBC w/Diff Recent Labs     07/09/19  0248 07/08/19  0435 07/07/19  0232   WBC 9.9 11.0 9.1   RBC 3.09* 2.86* 2.89*   HGB 9.6* 8.8* 8.9*   HCT 31.7* 29.5* 30.1*    340 362   GRANS 77* 72 75   LYMPH 9* 10* 11*   EOS 2 3 2      Cardiac Enzymes No results for input(s): CPK, CKND1, ROWAN in the last 72 hours. No lab exists for component: CKRMB, TROIP   Coagulation Recent Labs     07/09/19 0248 07/08/19  0435   PTP 24.4* 19.5*   INR 2.2* 1.7*       Lipid Panel No results found for: CHOL, CHOLPOCT, CHOLX, CHLST, CHOLV, 259117, HDL, LDL, LDLC, DLDLP, 465491, VLDLC, VLDL, TGLX, TRIGL, TRIGP, TGLPOCT, CHHD, CHHDX   BNP No results for input(s): BNPP in the last 72 hours. Liver Enzymes No results for input(s): TP, ALB, TBIL, AP, SGOT, GPT in the last 72 hours.     No lab exists for component: DBIL   Thyroid Studies No results found for: T4, T3U, TSH, TSHEXT     Procedures/imaging: see electronic medical records for all procedures/Xrays and details which were not copied into this note but were reviewed prior to creation of [address] Shawn Cunningham, PAJannaC

## 2019-07-09 NOTE — PROGRESS NOTES
Problem: Mobility Impaired (Adult and Pediatric)  Goal: *Acute Goals and Plan of Care (Insert Text)  Description  Physical Therapy Goals   Updated 7/8/2019 and to be accomplished within 5 day(s)  1. Patient will move from supine <> sit with mod I in prep for out of bed activity and change of position. 2.  Patient will perform sit<> stand with S/RW in prep for transfers/ambulation. 3.  Patient will transfer from bed <> chair with S/RW for time up in chair for completion of ADL activity. 4.  Patient will ambulate 100 feet with CGA/RW for improved functional mobility/safe discharge. Physical Therapy Goals   Initiated 7/5/2019 and to be accomplished within 5 day(s)  1. Patient will move from supine <> sit with mod I in prep for out of bed activity and change of position. 2.  Patient will perform sit<> stand with CGA/RW in prep for transfers/ambulation. 3.  Patient will transfer from bed <> chair with CGA/RW for time up in chair for completion of ADL activity. 4.  Patient will ambulate 100 feet with CGA/RW for improved functional mobility/safe discharge. Outcome: Progressing Towards Goal   PHYSICAL THERAPY TREATMENT    Patient: Shannon Guzman (32 y.o. male)  Date: 7/9/2019  Diagnosis: COPD exacerbation (HCC) [J44.1]  Pleural effusion [J90]  Weakness [R53.1]  Weakness [R53.1] Weakness    Precautions: Fall, Aspiration   Chart, physical therapy assessment, plan of care and goals were reviewed. ASSESSMENT:  Pt agreeable to participate, however pt presents with decrease tolerance to activity and fatigues quickly. Pt amb 35' with RW CGA. Mild LOB while turning requiring Min A to recover. Cont POC. Progression toward goals:  ?      Improving appropriately and progressing toward goals  ? Improving slowly and progressing toward goals  ?       Not making progress toward goals and plan of care will be adjusted     PLAN:  Patient continues to benefit from skilled intervention to address the above impairments. Continue treatment per established plan of care. Discharge Recommendations:  Rehab  Further Equipment Recommendations for Discharge:  rolling walker     SUBJECTIVE:   Patient stated ? ? OBJECTIVE DATA SUMMARY:   Critical Behavior:  Neurologic State: Alert  Orientation Level: Oriented X4  Cognition: Appropriate decision making  Safety/Judgement: Awareness of environment  Functional Mobility Training:  Bed Mobility:  Supine to Sit: Contact guard assistance  Sit to Supine: Contact guard assistance    Transfers:  Sit to Stand: Contact guard assistance  Stand to Sit: Contact guard assistance    Balance:  Sitting: Intact  Standing: Intact;Pull to stand  Ambulation/Gait Training:  Distance (ft): 35 Feet (ft)  Assistive Device: Walker, rolling;Gait belt  Ambulation - Level of Assistance: Contact guard assistance  Gait Abnormalities: Decreased step clearance  Base of Support: Narrowed  Speed/Julia: Slow  Step Length: Left shortened;Right shortened  Interventions: Verbal cues    Activity Tolerance:   Fair     After treatment:   ? Patient left in no apparent distress sitting up in chair  ? Patient left in no apparent distress in bed  ? Call bell left within reach  ? Nursing notified  ? Caregiver present  ?  Bed alarm activated      Jake Dc PTA   Time Calculation: 23 mins

## 2019-07-09 NOTE — ROUTINE PROCESS
Bedside and Verbal shift change report given to Erin Morrison RN (oncoming nurse) by Stefano Hoff RN  (offgoing nurse). Report included the following information SBAR, Kardex, Intake/Output and MAR.

## 2019-07-09 NOTE — PROGRESS NOTES
Pharmacy Dosing Services: Warfarin    Consult for Warfarin Dosing by Pharmacy by Dr. Samira Alexandra provided for this 69 y.o.  male , for indication of Atrial Fibrillation. Day of Therapy (continuation of home med)  Dose to achieve an INR goal of 2-3    INR = 2.2 (increased from 1.7 in 24 hours). Reduced dose for today. Order entered for Warfarin  0.5 mg to be given today at 18:00. Significant drug interactions: azithromycin  LABS    PT/INR Lab Results   Component Value Date/Time    INR 2.2 (H) 07/09/2019 02:48 AM        Platelets Lab Results   Component Value Date/Time    PLATELET 642 99/17/4253 02:48 AM        H/H     Lab Results   Component Value Date/Time    HGB 9.6 (L) 07/09/2019 02:48 AM          Warfarin Administrations (last 168 hours)       Date/Time Action Medication Dose    07/08/19 1938 Given    warfarin (COUMADIN) tablet 2.5 mg 2.5 mg    07/07/19 1735 Given   [INR 1.3]    warfarin (COUMADIN) tablet 2.5 mg 2.5 mg    07/06/19 1814 Given    warfarin (COUMADIN) tablet 2 mg 2 mg    07/05/19 1750 Given    warfarin (COUMADIN) tablet 1.5 mg 1.5 mg    07/05/19 0259 Given    warfarin (COUMADIN) tablet 1.5 mg 1.5 mg          Pharmacy to follow daily and will provide subsequent Warfarin dosing based on clinical status.   TAJ Mercado  Contact information [phone]

## 2019-07-09 NOTE — PROGRESS NOTES
D/C Plan: SNF to [facility] met with pt at bedside to conduct a face to face interview. CM awaiting response from facility. Fernando are reviewing as well. Anticipate pt will transition to SNF with in the next 24 hours pending an accepting facility. CM continuing to follow. Pt has been provided a copy of the [facility] to keep for his records. Care Management Interventions  PCP Verified by CM:  Yes  Transition of Care Consult (CM Consult): SNF  Partner SNF: No  Health Maintenance Reviewed: Yes  Physical Therapy Consult: Yes  Speech Therapy Consult: Yes  Current Support Network: Lives Alone  Plan discussed with Pt/Family/Caregiver: Yes  Freedom of Choice Offered: Yes  Discharge Location  Discharge Placement: Skilled nursing facility

## 2019-07-09 NOTE — PROGRESS NOTES
0849 Pt c/o chest tightness this AM. Some report of difficulty breathing. Noted that pt's O2 is 2L via nasal cannula, per pt's request.    SHIFT SUMMARY: No other events. Pt was given breathing tx for chest tightness and. Roxicodone 5mg and tylenol were given as ordered for pain. BM with loose stool today. Voiding.

## 2019-07-10 VITALS
HEART RATE: 82 BPM | RESPIRATION RATE: 18 BRPM | OXYGEN SATURATION: 100 % | DIASTOLIC BLOOD PRESSURE: 79 MMHG | BODY MASS INDEX: 21.66 KG/M2 | TEMPERATURE: 98.2 F | SYSTOLIC BLOOD PRESSURE: 133 MMHG | HEIGHT: 65 IN | WEIGHT: 130 LBS

## 2019-07-10 LAB
GLUCOSE BLD STRIP.AUTO-MCNC: 124 MG/DL (ref 70–110)
GLUCOSE BLD STRIP.AUTO-MCNC: 273 MG/DL (ref 70–110)
INR PPP: 2.5 (ref 0.8–1.2)
PROTHROMBIN TIME: 27.3 SEC (ref 11.5–15.2)

## 2019-07-10 PROCEDURE — 36415 COLL VENOUS BLD VENIPUNCTURE: CPT

## 2019-07-10 PROCEDURE — 85610 PROTHROMBIN TIME: CPT

## 2019-07-10 PROCEDURE — 74011250637 HC RX REV CODE- 250/637: Performed by: FAMILY MEDICINE

## 2019-07-10 PROCEDURE — 92526 ORAL FUNCTION THERAPY: CPT

## 2019-07-10 PROCEDURE — 74011250637 HC RX REV CODE- 250/637: Performed by: HOSPITALIST

## 2019-07-10 PROCEDURE — 74011636637 HC RX REV CODE- 636/637: Performed by: HOSPITALIST

## 2019-07-10 PROCEDURE — 77010033678 HC OXYGEN DAILY

## 2019-07-10 PROCEDURE — 74011000250 HC RX REV CODE- 250: Performed by: HOSPITALIST

## 2019-07-10 PROCEDURE — 82962 GLUCOSE BLOOD TEST: CPT

## 2019-07-10 RX ORDER — WARFARIN 1 MG/1
0.5 TABLET ORAL ONCE
Status: DISCONTINUED | OUTPATIENT
Start: 2019-07-10 | End: 2019-07-10 | Stop reason: HOSPADM

## 2019-07-10 RX ADMIN — OMEPRAZOLE 20 MG: 20 CAPSULE, DELAYED RELEASE ORAL at 08:34

## 2019-07-10 RX ADMIN — IPRATROPIUM BROMIDE AND ALBUTEROL SULFATE 3 ML: .5; 3 SOLUTION RESPIRATORY (INHALATION) at 12:27

## 2019-07-10 RX ADMIN — FLUTICASONE FUROATE AND VILANTEROL TRIFENATATE 1 PUFF: 200; 25 POWDER RESPIRATORY (INHALATION) at 08:43

## 2019-07-10 RX ADMIN — INSULIN GLARGINE 10 UNITS: 100 INJECTION, SOLUTION SUBCUTANEOUS at 08:35

## 2019-07-10 RX ADMIN — LOSARTAN POTASSIUM 25 MG: 25 TABLET ORAL at 08:34

## 2019-07-10 RX ADMIN — IPRATROPIUM BROMIDE AND ALBUTEROL SULFATE 3 ML: .5; 3 SOLUTION RESPIRATORY (INHALATION) at 04:09

## 2019-07-10 RX ADMIN — TAMSULOSIN HYDROCHLORIDE 0.4 MG: 0.4 CAPSULE ORAL at 08:34

## 2019-07-10 RX ADMIN — ATORVASTATIN CALCIUM 40 MG: 20 TABLET, FILM COATED ORAL at 08:34

## 2019-07-10 RX ADMIN — OXYCODONE HYDROCHLORIDE 5 MG: 5 TABLET ORAL at 04:08

## 2019-07-10 RX ADMIN — MULTIPLE VITAMINS W/ MINERALS TAB 1 TABLET: TAB at 08:34

## 2019-07-10 RX ADMIN — BUPROPION HYDROCHLORIDE 300 MG: 150 TABLET, EXTENDED RELEASE ORAL at 08:34

## 2019-07-10 RX ADMIN — Medication 250 MG: at 08:34

## 2019-07-10 RX ADMIN — OXYCODONE HYDROCHLORIDE 5 MG: 5 TABLET ORAL at 08:34

## 2019-07-10 RX ADMIN — FERROUS SULFATE TAB 325 MG (65 MG ELEMENTAL FE) 325 MG: 325 (65 FE) TAB at 08:34

## 2019-07-10 RX ADMIN — DULOXETINE HYDROCHLORIDE 60 MG: 60 CAPSULE, DELAYED RELEASE ORAL at 08:34

## 2019-07-10 NOTE — PROGRESS NOTES
0800-Received pt resting in bed with eyes open, no sign of distress, vss, calm and cooperative, will continue to monitor  1218-Report given to Lorrie over phone, all questions answered

## 2019-07-10 NOTE — PROGRESS NOTES
Problem: Dysphagia (Adult)  Goal: *Acute Goals and Plan of Care (Insert Text)  Description  Recommendations:  Diet: Soft solid/thin liquid, NO STRAWS    Meds: Crushed in puree  STRICT Aspiration Precautions  Oral Care TID  Left head turn with all swallows  Alternate solid/liquid     Goals:  Patient will:  1. Tolerate PO trials with 0 s/s overt distress in 4/5 trials  2. Utilize compensatory swallow strategies/maneuvers (decrease bite/sip, size/rate, alt. liq/sol) with min cues in 4/5 trials  3. Perform oral-motor/laryngeal exercises to increase oropharyngeal swallow function with min cues  4. Complete an objective swallow study (i.e., MBSS) to assess swallow integrity, r/o aspiration, and determine of safest LRD, min A (goal met 7/5)    Outcome: Progressing Towards Goal    SPEECH LANGUAGE PATHOLOGY DYSPHAGIA TREATMENT    Patient: Herlinda Hough (13 y.o. male)  Date: 7/10/2019  Diagnosis: COPD exacerbation (HCC) [J44.1]  Pleural effusion [J90]  Weakness [R53.1]  Weakness [R53.1] Weakness       Precautions:  Fall, Aspiration  PLOF:as per H&P     ASSESSMENT:  Pt seen b/s for dysphagia tx, tolerance of po feeds and follow through of compensatory swallow strategies. Pt awake, alert, able to recall and verbalize all 3 compensatory swallow strategies posted from MBS (no straws, head turn left, alternate solids/ liquids). Pt accepted soft solids and cup sips thin, dry cough response prior to po  Pt demo functional mastication, timely A-P transit, no oral residue, timely swallow response with appropriate laryngeal elevation, and no overt s/sx aspiration. Pt did present with cough response throughout session without change in quality from prior to po. Pt able to complete head turn left with supervision, independent no straws and alternating solids/ liquids, strategies printed out and posted on wipe board in pt's room. Created handout re: swallowing exercises, attempted to print out without success.   Reviewed exercises with pt, pt able to recall from previous SLP sessions over the last couple of years. Notified staff, if exercise handout prints out, to provide to pt; staff agreed. Rec: continue soft solid diet with thin liquids, NO STRAWS, HEAD TURN LEFT WITH EACH SWALLOW, ALTERNATE SOLIDS/ LIQUIDS, strict aspiration precautions. SLP will continue to follow per POC. Progression toward goals:  ?         Improving appropriately and progressing toward goals  ? Improving slowly and progressing toward goals  ? Not making progress toward goals and plan of care will be adjusted     PLAN:  Recommendations and Planned Interventions:  continue soft solid diet with thin liquids, NO STRAWS, HEAD TURN LEFT WITH EACH SWALLOW, ALTERNATE SOLIDS/ LIQUIDS, strict aspiration precautions. Patient continues to benefit from skilled intervention to address the above impairments. Continue treatment per established plan of care. Discharge Recommendations:  Inpatient Rehab, Outpatient and To Be Determined     SUBJECTIVE:   Patient stated ? This all started about 2 years ago when I had that surgery. They went from the front of my throat to fix my spine. ?    OBJECTIVE:   Cognitive and Communication Status:  Neurologic State: Alert, Appropriate for age  Orientation Level: Oriented X4, Appropriate for age  Cognition: Follows commands, Appropriate decision making, Appropriate for age attention/concentration, Appropriate safety awareness  Perception: Appears intact  Perseveration: No perseveration noted  Safety/Judgement: Fall prevention, Good awareness of safety precautions  Dysphagia Treatment:  Oral Assessment:  Oral Assessment  Labial: No impairment  Dentition: Natural, Intact  Oral Hygiene: good  Lingual: No impairment  Velum: No impairment  Mandible: No impairment  P.O. Trials:   Patient Position: HOB 60*   Vocal quality prior to P.O.: No impairment   Consistency Presented:  Thin liquid, Solid   How Presented: Self-fed/presented, Cup/sip       Bolus Acceptance: No impairment   Bolus Formation/Control: No impairment       Propulsion: No impairment   Oral Residue: None   Initiation of Swallow: No impairment   Laryngeal Elevation: Functional   Aspiration Signs/Symptoms: None, Other (comment)(dry cough prior to and during po trials)   Pharyngeal Phase Characteristics: Suspected pharyngeal residue(valleculae residue per MBS)   Effective Modifications: Alternate liquids/solids, Left head turn, Cup/sip, Small sips and bites   Cues for Modifications: Minimal         Oral Phase Severity: No impairment   Pharyngeal Phase Severity : Moderate-severe     PAIN:  Pain level pre-treatment: 0/10   Pain level post-treatment: 0/10     After treatment:   ?              Patient left in no apparent distress sitting up in chair  ? Patient left in no apparent distress in bed  ? Call bell left within reach  ? Nursing notified  ? Family present  ? Caregiver present  ? Bed alarm activated      COMMUNICATION/EDUCATION:   ? Aspiration precautions; swallow safety; compensatory techniques  ? Patient unable to participate in education; education ongoing with staff  ? Posted safety precautions in patient's room.   ? Oral-motor/laryngeal strengthening exercises      Alicia Zee MS, CCC/SLP  Time Calculation: 44 mins

## 2019-07-10 NOTE — PROGRESS NOTES
DC Plan: Discharge to [facility] today via medical transport    Chart reviewed. This CM spoke with Queen Soham @ [facility] and they can accept pt for rehab to LTC. UAI completed and on chart. Nicole Faye has assisted with sending UAI to facility. Per Sherif Harris pt can be sent any time. Met with pt at bedside and made him aware. Per pts request this CM has spoke with friend, Elan Quintero [phone] and made him aware of dc plan. This CM has also made friend, Carlos Golden  aware of dc plan via phone. Unit secretary will assist with transport. Questions address, CM will cont to follow. Transition of care to SNF: [facility]     Communication to Patient/Family:  Met with patient and family, and they are agreeable to the transition plan. SNF/Rehab Transition:  Patient has been accepted to [facility] and [address] [place] [address],  [address], and meets criteria for admission. Patient will transported by medical transport and expected to leave at 1230-1p . Communication to SNF/Rehab:  Bedside RN, Sherrill Fuller, has been notified to update the transition plan to the facility and call report [phone]     Discharge information has been updated on the AVS and sent via [facility] and/or CC link. Discharge instructions to be fax'd to facility at [phone]     Please include all hard scripts for controlled substances, med rec and dc summary, and AVS in packet. Please medicate for pain prior to dc if possible and needed to help offset delay when patient first arrives to facility. Reviewed and confirmed with facility, Ryley Vasquez can manage the patient care needs for the following:     Augustine Erwin with (X) only those applicable:  Medication:  [x]Medications are available at the facility  []IV Antibiotics    []Controlled Substance  hard copies available sent.   []Weekly Labs    Equipment:  []CPAP/BiPAP  []Wound Vacuum  []Alcazar or Urinary Device  []PICC/Central Line  []Nebulizer  []Ventilator    Treatment:  []Isolation (for MRSA, VRE, etc.)  []Surgical Drain Management  []Tracheostomy Care  []Dressing Changes  []Dialysis with transportation  []PEG Care  [x]Oxygen  []Daily Weights for Heart Failure    Dietary:  []Any diet limitations  []Tube Feedings   []Total Parenteral Management (TPN)    Financial Resources:  []Medicaid Application Completed  [x]UAI Completed  []Level II Completed  [] Private pay individual who will not become financially eligible for Medicaid within 6 months from admission to a [address] [place] [address] facility. [] Individual refused to have screening conducted. Advanced Care Plan:  []Surrogate Decision Maker of Care  [x]POA  []Communicated Code Status and copy sent. Other:         Care Management Interventions  PCP Verified by CM:  Yes  Transition of Care Consult (CM Consult): SNF  Partner SNF: No  Health Maintenance Reviewed: Yes  Physical Therapy Consult: Yes  Speech Therapy Consult: Yes  Current Support Network: Lives Alone  Plan discussed with Pt/Family/Caregiver: Yes  Freedom of Choice Offered: Yes  Discharge Location  Discharge Placement: Skilled nursing facility

## 2019-07-10 NOTE — PROGRESS NOTES
SWALLOW GUIDELINES    * Dental soft solids/ thin liquids    * STRICT HEAD TURN LEFT WITH EACH SWALLOW    * ALTERNATE SOLIDS/ LIQUIDS    * NO STRAWS

## 2019-07-10 NOTE — DISCHARGE SUMMARY
Discharge Summary    Patient: Charlene Vaughan MRN: [id number]  CSN: [id number]    YOB: 1945  Age: 69 y.o. Sex: male    DOA: 7/4/2019 LOS:  LOS: 3 days   Discharge Date:      Primary Care Provider:  Bernice Thompson MD    Admission Diagnoses: COPD exacerbation ([facility] [J44.1]  Pleural effusion [J90]  Weakness [R53.1]  Weakness [R53.1]    Discharge Diagnoses:    Problem List as of 7/10/2019 Date Reviewed: 7/5/2019          Codes Class Noted - Resolved    Leukocytosis ICD-10-CM: D72.829  ICD-9-CM: 288.60  7/6/2019 - Present        Chronic respiratory failure ([place] [address].) ICD-10-CM: J96.10  ICD-9-CM: 518.83  7/6/2019 - Present        Pleural effusion ICD-10-CM: J90  ICD-9-CM: 511.9  7/5/2019 - Present        COPD exacerbation ([place] [address].) ICD-10-CM: J44.1  ICD-9-CM: 491.21  7/5/2019 - Present        * (Principal) Weakness ICD-10-CM: R53.1  ICD-9-CM: 780.79  7/5/2019 - Present        A-fib ([place] [address].) ICD-10-CM: I48.91  ICD-9-CM: 427.31  7/5/2019 - Present        Diabetes ([place] [address].) ICD-10-CM: E11.9  ICD-9-CM: 250.00  7/5/2019 - Present        CAD (coronary artery disease) ICD-10-CM: I25.10  ICD-9-CM: 414.00  7/5/2019 - Present              Discharge Medications:     Current Discharge Medication List      CONTINUE these medications which have NOT CHANGED    Details   !! warfarin (COUMADIN) 2 mg tablet Take 1 mg by mouth four (4) days a week. !! warfarin (COUMADIN) 3 mg tablet Take 3 mg by mouth Three (3) times a week. donepezil (ARICEPT) 10 mg tablet Take 10 mg by mouth nightly. tamsulosin (FLOMAX) 0.4 mg capsule Take 0.4 mg by mouth daily. ondansetron hcl (ZOFRAN) 4 mg tablet Take 1 Tab by mouth every eight (8) hours as needed for Nausea. Qty: 12 Tab, Refills: 1      albuterol (PROVENTIL HFA, VENTOLIN HFA, PROAIR HFA) 90 mcg/actuation inhaler Take  by inhalation. aspirin 81 mg chewable tablet Take 81 mg by mouth daily. atorvastatin (LIPITOR) 40 mg tablet Take 40 mg by mouth daily.       buPROPion XL (WELLBUTRIN XL) 300 mg XL tablet Take 300 mg by mouth every morning. docusate sodium (COLACE) 100 mg capsule Take 100 mg by mouth two (2) times a day. DULoxetine (CYMBALTA) 60 mg capsule Take 60 mg by mouth daily. ferrous sulfate 325 mg (65 mg iron) cpER Take  by mouth. fluticasone-salmeterol (ADVAIR DISKUS) 500-50 mcg/dose diskus inhaler Take 1 Puff by inhalation every twelve (12) hours. losartan (COZAAR) 25 mg tablet Take 25 mg by mouth daily. omeprazole (PRILOSEC) 20 mg capsule Take 20 mg by mouth daily. polyethylene glycol (MIRALAX) 17 gram/dose powder Take 17 g by mouth daily. repaglinide (PRANDIN) 0.5 mg tablet Take 0.5 mg by mouth Before breakfast, lunch, and dinner. SITagliptin (JANUVIA) 100 mg tablet Take 100 mg by mouth daily. multivitamin (ONE A DAY) tablet Take 1 Tab by mouth daily. !! - Potential duplicate medications found. Please discuss with provider. STOP taking these medications       insulin lispro (HUMALOG) 100 unit/mL kwikpen Comments:   Reason for Stopping:         HYDROcodone-acetaminophen (NORCO) 5-325 mg per tablet Comments:   Reason for Stopping:         lidocaine (LIDODERM) 5 % Comments:   Reason for Stopping:               Discharge Condition: Stable    Procedures : None    Consults: None      PHYSICAL EXAM   Visit Vitals  /69   Pulse 89   Temp 98.3 °F (36.8 °C)   Resp 18   Ht 5' 5\" (1.651 m)   Wt 59 kg (130 lb)   SpO2 100%   BMI 21.63 kg/m²     General: Elderly white male. Debilitated. Awake, cooperative, no acute distress    HEENT: NC, Atraumatic. PERRLA, EOMI. Anicteric sclerae. Lungs:  CTA Bilaterally. No Wheezing/Rhonchi/Rales. Heart:  Irregularly irregular,  No murmur, No Rubs, No Gallops  Abdomen: Soft, Non distended, Non tender. +Bowel sounds,   Extremities: No c/c/e  Psych:   Not anxious or agitated. Neurologic:  No acute neurological deficits.                                      Admission HPI : Kala Poag is a 69 y.o. male who came in by ambulance because he couldn't take care of himself after getting into his apartment yesterday after being discharged from rehab. He has been using a wheelchair or a walker but the apartment that he rented is 2 stories. The medics had to carry him up the second story when he moved in and he was not strong enough to climb the stairs on his own. He also realized he couldn't cook or take care of himself so called EMS to bring him to the hospital. He has a sister in [place] and a niece in [address] [place] [address] who want him to consider assisted living or nursing home. No family locally but his DPOA is Iraida Norwood (friend) [phone]. Hospital Course : This patient was admitted to medical services on July 4, 2019 for weakness and debility. Other admission diagnoses include chronic atrial fibrillation on coumadin, coronary artery disease, NIDDM2, diarrhea, and chronic home oxygen use. The patient was admitted to medical services for continued care. Physical therapy was consulted and the patient worked with PT over the course of his hospital stay. He resumed the use of his chronic home oxygen throughout. He complained of diarrhea on admission, and C.diff was checked and was negative. The patient was resumed on his usual home medications, and his coumadin dose was monitored by pharmacy. The patient developed leukocytosis and was started on empiric antibiotics, and his leukocytosis resolved. Case management worked to secure a safe discharge plan for this patient, and he will be discharged to [facility] for long term care.      Activity: PT/OT Eval and Treat    Diet: Dental soft    Follow-up: PCP    Disposition: LT    Minutes spent on discharge: 35       Labs: Results:       Chemistry Recent Labs     07/08/19  0435   GLU 90      K 3.6   CL 97*   CO2 41*   BUN 9   CREA 0.48*   CA 9.2   AGAP 2*   BUCR 19      CBC w/Diff Recent Labs     07/09/19  0248 07/08/19  0435   WBC 9.9 11.0   RBC 3.09* 2.86*   HGB 9.6* 8.8*   HCT 31.7* 29.5*    340   GRANS 77* 72   LYMPH 9* 10*   EOS 2 3      Cardiac Enzymes No results for input(s): CPK, CKND1, ROWAN in the last 72 hours. No lab exists for component: CKRMB, TROIP   Coagulation Recent Labs     07/10/19  0420 07/09/19  0248   PTP 27.3* 24.4*   INR 2.5* 2.2*       Lipid Panel No results found for: CHOL, CHOLPOCT, CHOLX, CHLST, CHOLV, 345564, HDL, LDL, LDLC, DLDLP, 691286, VLDLC, VLDL, TGLX, TRIGL, TRIGP, TGLPOCT, CHHD, CHHDX   BNP No results for input(s): BNPP in the last 72 hours. Liver Enzymes No results for input(s): TP, ALB, TBIL, AP, SGOT, GPT in the last 72 hours. No lab exists for component: DBIL   Thyroid Studies No results found for: T4, T3U, TSH, TSHEXT         Significant Diagnostic Studies: Xr Swallow Func Video    Result Date: 7/5/2019  Modified Barium swallow History: Stroke, dysphagia,, feeding difficulties Technique: The patient orally ingested various barium materials under the direction of a speech pathologist with direct fluoroscopic observation. Fluoroscopic time: 3.6 minutes Fluoroscopic dose (reference air kerma): 8.86 mGy Findings: The patient ingested thin, pudding, and solid consistencies. Trace penetration noted with thin liquids administered by cup. Oropharyngeal phase was remarkable for oral delay with solid consistency. Vallecular residue present with all tested consistencies. IMPRESSION: 1. Trace laryngeal penetration with thin liquids administered by cup. No aspiration. Please refer to the separate report and recommendations from the speech pathologist.    Xr Chest Port    Result Date: 7/4/2019  EXAM: CHEST RADIOGRAPH CLINICAL INDICATION/HISTORY: SOB -Additional: None COMPARISON: 1/1/2019 TECHNIQUE: Portable frontal view of the chest _______________ FINDINGS: SUPPORT DEVICES: None. HEART AND MEDIASTINUM: No appreciable cardiomegaly. Remaining mediastinal contours within normal limits.  LUNGS AND PLEURAL SPACES: Pleural thickening is seen on the right. There is a small right pleural effusion about the same. Atelectasis right lower lobe improved. There is atelectasis in the left lung base which is worse than prior. No pneumothorax. Surgical clips project over the right chest. BONY THORAX AND SOFT TISSUES: Vertebroplasty mid thoracic spine unchanged. Surgical sutures left chest appears new from prior. _______________     IMPRESSION: 1. Atelectasis/scarring left lower lobe appears worse than prior. Patient apparently has had left lung surgery. 2. Right pleural effusion appears small. Atelectasis right lower lobe improved. No results found for this or any previous visit.         CC: Lori Rm MD

## 2019-07-10 NOTE — PROGRESS NOTES
Pharmacy Dosing Services:   Warfarin    Consult for Warfarin Dosing by Pharmacy by Dr. Louis Gaona provided for this 69 y.o.  male , for indication of Atrial Fibrillation. Dose to achieve an INR goal of 2-3    Order entered for  Warfarin  0.5 (mg) ordered to be given today at 18:00. Significant drug interactions:  Azithromycin    LABS    PT/INR Lab Results   Component Value Date/Time    INR 2.5 (H) 07/10/2019 04:20 AM      Platelets Lab Results   Component Value Date/Time    PLATELET 903 77/47/3645 02:48 AM      H/H     Lab Results   Component Value Date/Time    HGB 9.6 (L) 07/09/2019 02:48 AM        Warfarin Administrations (last 168 hours)     Date/Time Action Medication Dose    07/09/19 1807 Given   [INR 2.2]    warfarin (COUMADIN) tablet 0.5 mg 0.5 mg    07/08/19 1938 Given    warfarin (COUMADIN) tablet 2.5 mg 2.5 mg    07/07/19 1735 Given   [INR 1.3]    warfarin (COUMADIN) tablet 2.5 mg 2.5 mg    07/06/19 1814 Given    warfarin (COUMADIN) tablet 2 mg 2 mg    07/05/19 1750 Given    warfarin (COUMADIN) tablet 1.5 mg 1.5 mg    07/05/19 0259 Given    warfarin (COUMADIN) tablet 1.5 mg 1.5 mg          Pharmacy to follow daily and will provide subsequent Warfarin dosing based on clinical status.   HUY [facility]    Contact information   [phone]

## 2019-07-10 NOTE — PROGRESS NOTES
Shift Progress Note:  Assumed care of patien in bed awake and alert. Medicated x1 for chest and abdominal discomfort. Requested nebulizer treatment with relief noted per patient. Remains on 2 lpm by nasal cannula. Otherwise uneventful night. Call bell within reach, bed alarm on for safety.   Patient Vitals for the past 12 hrs:   Temp Pulse Resp BP SpO2   07/10/19 0724 98.3 °F (36.8 °C) 89 18 152/69 100 %   07/10/19 0340 98.3 °F (36.8 °C) 65 18 147/77 100 %   07/09/19 2231 98 °F (36.7 °C) 73 18 148/77 100 %

## 2019-07-18 ENCOUNTER — HOSPITAL ENCOUNTER (OUTPATIENT)
Dept: LAB | Age: 74
Discharge: HOME | End: 2019-07-18

## 2019-07-18 LAB
INR PPP: 1.2 (ref 0.8–1.2)
PROTHROMBIN TIME: 14.8 SEC (ref 11.5–15.2)

## 2019-07-18 PROCEDURE — 85610 PROTHROMBIN TIME: CPT

## 2019-07-22 LAB
FAX TO INFO,FAXT: NORMAL
FAX TO NUMBER,FAXN: NORMAL

## 2019-07-25 ENCOUNTER — PATIENT OUTREACH (OUTPATIENT)
Dept: CASE MANAGEMENT | Age: 74
End: 2019-07-25

## 2019-07-25 NOTE — PROGRESS NOTES
Community Care Team Documentation for Patient in [facility]     Patient discharged from [address] to [address] [place] [address], on 7/10/19. Hospital Discharge diagnosis:       Chronic Resp failure   Pleural Effusion   COPD   Weakness   A Fib   Diabetes   CAD    SNF Attending Provider:      Anticipated discharge date from SNF:  TBD    PCP : Rachid Palacios MD    Nurse Navigator:     [facility] Team rounds completed, updates provided by facility. Brief Summary of Care:    Patient receiving skilled therapy, will transition to LTC at another facility (present one has no LTC beds). RRAT:  Low Risk            11       Total Score        3 Has Seen PCP in Last 6 Months (Yes=3, No=0)    8 Charlson Comorbidity Score (Age + Comorbid Conditions)        Criteria that do not apply:    . Living with Significant Other. Assisted Living. LTAC. SNF. or   Rehab    Patient Length of Stay (>5 days = 3)    IP Visits Last 12 Months (1-3=4, 4=9, >4=11)    Pt.  Coverage (Medicare=5 , Medicaid, or Self-Pay=4)        Active Ambulatory Problems     Diagnosis Date Noted    Pleural effusion 07/05/2019    COPD exacerbation ([address] 07/05/2019    Weakness 07/05/2019    A-fib ([address] 07/05/2019    Diabetes ([facility] 07/05/2019    CAD (coronary artery disease) 07/05/2019    Leukocytosis 07/06/2019    Chronic respiratory failure ([facility] 07/06/2019     Resolved Ambulatory Problems     Diagnosis Date Noted    No Resolved Ambulatory Problems     No Additional Past Medical History             Lne Hudson, Alicia Rushing

## 2019-08-14 ENCOUNTER — HOSPITAL ENCOUNTER (OUTPATIENT)
Dept: LAB | Age: 74
Discharge: HOME | End: 2019-08-14

## 2019-08-14 LAB
ALBUMIN SERPL-MCNC: 3.2 G/DL (ref 3.4–5)
ALBUMIN/GLOB SERPL: 0.9 {RATIO} (ref 0.8–1.7)
ALP SERPL-CCNC: 142 U/L (ref 45–117)
ALT SERPL-CCNC: 32 U/L (ref 16–61)
ANION GAP SERPL CALC-SCNC: 5 MMOL/L (ref 3–18)
AST SERPL-CCNC: 18 U/L (ref 10–38)
BASOPHILS # BLD: 0 K/UL (ref 0–0.1)
BASOPHILS NFR BLD: 0 % (ref 0–3)
BILIRUB SERPL-MCNC: 0.6 MG/DL (ref 0.2–1)
BUN SERPL-MCNC: 15 MG/DL (ref 7–18)
BUN/CREAT SERPL: 23 (ref 12–20)
CALCIUM SERPL-MCNC: 9.8 MG/DL (ref 8.5–10.1)
CHLORIDE SERPL-SCNC: 86 MMOL/L (ref 100–111)
CO2 SERPL-SCNC: 44 MMOL/L (ref 21–32)
CREAT SERPL-MCNC: 0.65 MG/DL (ref 0.6–1.3)
DIFFERENTIAL METHOD BLD: ABNORMAL
EOSINOPHIL # BLD: 0.1 K/UL (ref 0–0.4)
EOSINOPHIL NFR BLD: 1 % (ref 0–5)
ERYTHROCYTE [DISTWIDTH] IN BLOOD BY AUTOMATED COUNT: 20.6 % (ref 11.6–14.5)
FAX TO INFO,FAXT: NORMAL
FAX TO NUMBER,FAXN: NORMAL
GLOBULIN SER CALC-MCNC: 3.7 G/DL (ref 2–4)
GLUCOSE SERPL-MCNC: 219 MG/DL (ref 74–99)
HCT VFR BLD AUTO: 29.2 % (ref 36–48)
HGB BLD-MCNC: 9 G/DL (ref 13–16)
LYMPHOCYTES # BLD: 1 K/UL (ref 0.8–3.5)
LYMPHOCYTES NFR BLD: 8 % (ref 20–51)
MAGNESIUM SERPL-MCNC: 2.3 MG/DL (ref 1.6–2.6)
MCH RBC QN AUTO: 33.2 PG (ref 24–34)
MCHC RBC AUTO-ENTMCNC: 30.8 G/DL (ref 31–37)
MCV RBC AUTO: 107.7 FL (ref 74–97)
MONOCYTES # BLD: 0.9 K/UL (ref 0–1)
MONOCYTES NFR BLD: 7 % (ref 2–9)
NEUTS BAND NFR BLD MANUAL: 3 % (ref 0–5)
NEUTS SEG # BLD: 10.6 K/UL (ref 1.8–8)
NEUTS SEG NFR BLD: 81 % (ref 42–75)
NRBC BLD-RTO: 2 PER 100 WBC
PLATELET # BLD AUTO: 525 K/UL (ref 135–420)
PLATELET COMMENTS,PCOM: ABNORMAL
PMV BLD AUTO: 10.4 FL (ref 9.2–11.8)
POTASSIUM SERPL-SCNC: 4.5 MMOL/L (ref 3.5–5.5)
PROT SERPL-MCNC: 6.9 G/DL (ref 6.4–8.2)
RBC # BLD AUTO: 2.71 M/UL (ref 4.7–5.5)
RBC MORPH BLD: ABNORMAL
SODIUM SERPL-SCNC: 135 MMOL/L (ref 136–145)
WBC # BLD AUTO: 13.1 K/UL (ref 4.6–13.2)

## 2019-08-14 PROCEDURE — 85025 COMPLETE CBC W/AUTO DIFF WBC: CPT

## 2019-08-14 PROCEDURE — 80053 COMPREHEN METABOLIC PANEL: CPT

## 2019-08-14 PROCEDURE — 83735 ASSAY OF MAGNESIUM: CPT

## 2023-09-25 NOTE — PROGRESS NOTES
Swallowing Exercises    1. Tongue hold [facility]):  Place your tongue between your teeth, bite down gently, hold it there as you swallow. Relax and repeat x 10.    2. Anterior tongue push:  Place the tip of your tongue up behind your top teeth, push for 3-5 seconds, relax and repeat x 20.    3.  Posterior tongue push:  Place the back of your tongue against the roof of your mouth (as if making the \"k\" or \"g\" sound, push for 3-5 seconds, relax and repeat x 20.      4. High pitch \"EE\" (falsetto \"ee\"): take a deep breath, prolong a high pitch/ falsetto \"EE\" sound 5-7 seconds, relax and repeat x 20. Complete above several times a day, preferably after meals. no